# Patient Record
Sex: MALE | Race: WHITE | NOT HISPANIC OR LATINO | Employment: OTHER | URBAN - NONMETROPOLITAN AREA
[De-identification: names, ages, dates, MRNs, and addresses within clinical notes are randomized per-mention and may not be internally consistent; named-entity substitution may affect disease eponyms.]

---

## 2017-11-30 ENCOUNTER — OFFICE VISIT (OUTPATIENT)
Dept: UROLOGY | Facility: CLINIC | Age: 67
End: 2017-11-30

## 2017-11-30 DIAGNOSIS — N40.0 BENIGN PROSTATIC HYPERPLASIA WITHOUT LOWER URINARY TRACT SYMPTOMS: Primary | ICD-10-CM

## 2017-11-30 LAB — PSA SERPL-MCNC: 2.37 NG/ML (ref 0–4)

## 2017-11-30 PROCEDURE — 84153 ASSAY OF PSA TOTAL: CPT | Performed by: UROLOGY

## 2017-11-30 PROCEDURE — 36415 COLL VENOUS BLD VENIPUNCTURE: CPT | Performed by: UROLOGY

## 2017-11-30 PROCEDURE — 99204 OFFICE O/P NEW MOD 45 MIN: CPT | Performed by: UROLOGY

## 2017-11-30 RX ORDER — ALLOPURINOL 300 MG/1
300 TABLET ORAL DAILY
COMMUNITY
Start: 2017-10-23

## 2017-11-30 RX ORDER — FINASTERIDE 5 MG/1
5 TABLET, FILM COATED ORAL DAILY
Qty: 30 TABLET | Refills: 11 | Status: SHIPPED | OUTPATIENT
Start: 2017-11-30 | End: 2017-12-30

## 2017-11-30 RX ORDER — FINASTERIDE 5 MG/1
TABLET, FILM COATED ORAL
COMMUNITY
Start: 2017-10-31 | End: 2018-12-20 | Stop reason: SDUPTHER

## 2017-12-01 PROBLEM — N40.0 BENIGN PROSTATIC HYPERPLASIA WITHOUT LOWER URINARY TRACT SYMPTOMS: Status: ACTIVE | Noted: 2017-12-01

## 2018-12-20 ENCOUNTER — OFFICE VISIT (OUTPATIENT)
Dept: UROLOGY | Facility: CLINIC | Age: 68
End: 2018-12-20

## 2018-12-20 VITALS — HEIGHT: 69 IN | WEIGHT: 170 LBS | BODY MASS INDEX: 25.18 KG/M2

## 2018-12-20 DIAGNOSIS — N40.0 BENIGN PROSTATIC HYPERPLASIA, UNSPECIFIED WHETHER LOWER URINARY TRACT SYMPTOMS PRESENT: Primary | ICD-10-CM

## 2018-12-20 DIAGNOSIS — N40.0 BENIGN PROSTATIC HYPERPLASIA WITHOUT LOWER URINARY TRACT SYMPTOMS: ICD-10-CM

## 2018-12-20 LAB — PSA SERPL-MCNC: 2.98 NG/ML (ref 0–4)

## 2018-12-20 PROCEDURE — 99214 OFFICE O/P EST MOD 30 MIN: CPT | Performed by: UROLOGY

## 2018-12-20 PROCEDURE — 36415 COLL VENOUS BLD VENIPUNCTURE: CPT | Performed by: UROLOGY

## 2018-12-20 PROCEDURE — 84153 ASSAY OF PSA TOTAL: CPT | Performed by: UROLOGY

## 2018-12-20 RX ORDER — FINASTERIDE 5 MG/1
5 TABLET, FILM COATED ORAL DAILY
Qty: 30 TABLET | Refills: 11 | Status: SHIPPED | OUTPATIENT
Start: 2018-12-20 | End: 2019-11-29 | Stop reason: SDUPTHER

## 2018-12-20 NOTE — PROGRESS NOTES
Chief Complaint:          Chief Complaint   Patient presents with   • Benign Prostatic Hypertrophy       HPI:   68 y.o. male.  68-year-old white male extremely well known to me status post extensive evaluation he's had a biopsy at that time he had a volume of 36 g his PSA D was 6.4 his PSA most recently was 4.1 in December 16 and 2.5 before that but this was after course of finasteride.  Currently he is doing great there is no fevers, chills, nausea, vomiting, frequency, urgency, intermittency, dribbling or any other significant urologic problem he's here for yearly prostate exam.  He returns today's doing great he's on finasteride.  He gets up less than one time at night.  He has no discharge, no blood in, no new medications, no new surgeries, no significant lower urinary tract symptomatology such as frequency, urgency, dribbling.  He is now coaching high school basketball as        Past Medical History:      History reviewed. No pertinent past medical history.      Current Meds:     Current Outpatient Medications   Medication Sig Dispense Refill   • allopurinol (ZYLOPRIM) 300 MG tablet      • finasteride (PROSCAR) 5 MG tablet        No current facility-administered medications for this visit.         Allergies:      No Known Allergies     Past Surgical History:     History reviewed. No pertinent surgical history.      Social History:     Social History     Socioeconomic History   • Marital status: Unknown     Spouse name: Not on file   • Number of children: Not on file   • Years of education: Not on file   • Highest education level: Not on file   Social Needs   • Financial resource strain: Not on file   • Food insecurity - worry: Not on file   • Food insecurity - inability: Not on file   • Transportation needs - medical: Not on file   • Transportation needs - non-medical: Not on file   Occupational History   • Not on file   Tobacco Use   • Smoking status: Never Smoker   • Smokeless tobacco: Never  Used   Substance and Sexual Activity   • Alcohol use: Yes     Comment: Occasional   • Drug use: No   • Sexual activity: Not on file   Other Topics Concern   • Not on file   Social History Narrative   • Not on file       Family History:     Family History   Problem Relation Age of Onset   • No Known Problems Father    • Heart disease Mother        Review of Systems:     Review of Systems   Constitutional: Negative.  Negative for chills, fatigue and fever.   HENT: Negative.    Eyes: Negative.    Respiratory: Negative.  Negative for cough, shortness of breath and wheezing.    Cardiovascular: Negative.  Negative for leg swelling.   Gastrointestinal: Negative.  Negative for abdominal pain, nausea and vomiting.   Endocrine: Negative.    Musculoskeletal: Negative.  Negative for back pain.   Allergic/Immunologic: Negative.    Neurological: Negative.  Negative for dizziness and headaches.   Hematological: Negative.    Psychiatric/Behavioral: Negative.  Negative for confusion.       Physical Exam:     Physical Exam   Constitutional: He is oriented to person, place, and time. He appears well-developed and well-nourished.   HENT:   Head: Normocephalic and atraumatic.   Eyes: Conjunctivae and EOM are normal. Pupils are equal, round, and reactive to light.   Neck: Normal range of motion.   Cardiovascular: Normal rate, regular rhythm, normal heart sounds and intact distal pulses.   Pulmonary/Chest: Effort normal and breath sounds normal.   Abdominal: Soft. Bowel sounds are normal.   Genitourinary:   Genitourinary Comments: Soft nontender abdomen with no organomegaly, rigidity, or tenderness.  He has normal external genitalia and uncircumcised phallus with a freely movable foreskin bilaterally descended testes without masses there is no inguinal hernias adenopathy or abnormalities he had good rectal tone and a large smooth firm prostate.  There is no nodularity or any suspicious rectal abnormalities     Musculoskeletal: Normal  range of motion.   Neurological: He is alert and oriented to person, place, and time. He has normal reflexes.   Skin: Skin is warm and dry.   Psychiatric: He has a normal mood and affect. His behavior is normal. Judgment and thought content normal.   Nursing note and vitals reviewed.      I have reviewed the following portions of the patient's history: allergies, current medications, past family history, past medical history, past social history, past surgical history, problem list and ROS and confirm it's accurate.      Procedure:       Assessment/Plan:   BPH: Discussed the pathophysiology of BPH and obstruction.  We discussed the static and dynamic effect effects of BPH as well as using 5 alpha reductase inhibitors versus alpha blockade.  We discussed the indications for transurethral surgery as well.  And/ or other therapeutic options available including all of the newer techniques.  Refill his finasteride     Patient's Body mass index is 25.1 kg/m². BMI is above normal parameters. Recommendations include: educational material.          This document has been electronically signed by JOSE BARTH MD December 20, 2018 2:03 PM

## 2018-12-24 ENCOUNTER — TELEPHONE (OUTPATIENT)
Dept: UROLOGY | Facility: CLINIC | Age: 68
End: 2018-12-24

## 2019-11-29 DIAGNOSIS — N40.0 BENIGN PROSTATIC HYPERPLASIA, UNSPECIFIED WHETHER LOWER URINARY TRACT SYMPTOMS PRESENT: ICD-10-CM

## 2019-12-02 RX ORDER — FINASTERIDE 5 MG/1
TABLET, FILM COATED ORAL
Qty: 30 TABLET | Refills: 11 | Status: SHIPPED | OUTPATIENT
Start: 2019-12-02 | End: 2021-06-03

## 2019-12-19 ENCOUNTER — OFFICE VISIT (OUTPATIENT)
Dept: UROLOGY | Facility: CLINIC | Age: 69
End: 2019-12-19

## 2019-12-19 VITALS — HEIGHT: 69 IN | WEIGHT: 170 LBS | BODY MASS INDEX: 25.18 KG/M2

## 2019-12-19 DIAGNOSIS — N40.0 BENIGN PROSTATIC HYPERPLASIA WITHOUT LOWER URINARY TRACT SYMPTOMS: Primary | ICD-10-CM

## 2019-12-19 DIAGNOSIS — N40.0 BENIGN PROSTATIC HYPERPLASIA, UNSPECIFIED WHETHER LOWER URINARY TRACT SYMPTOMS PRESENT: ICD-10-CM

## 2019-12-19 PROCEDURE — 99213 OFFICE O/P EST LOW 20 MIN: CPT | Performed by: UROLOGY

## 2019-12-19 RX ORDER — MONTELUKAST SODIUM 10 MG/1
10 TABLET ORAL NIGHTLY
COMMUNITY

## 2019-12-19 RX ORDER — CETIRIZINE HYDROCHLORIDE 10 MG/1
10 TABLET ORAL DAILY
COMMUNITY

## 2019-12-19 RX ORDER — LISINOPRIL 10 MG/1
10 TABLET ORAL DAILY
COMMUNITY

## 2019-12-19 RX ORDER — FINASTERIDE 5 MG/1
5 TABLET, FILM COATED ORAL DAILY
Qty: 30 TABLET | Refills: 11 | Status: SHIPPED | OUTPATIENT
Start: 2019-12-19 | End: 2020-12-10 | Stop reason: SDUPTHER

## 2019-12-19 NOTE — PROGRESS NOTES
Chief Complaint:          Chief Complaint   Patient presents with   • Benign Prostatic Hypertrophy       HPI:   69 y.o. male turns today he had a recent PSA done by Dr. Иван Arreaga in [place].  He has no symptoms, no nocturia, he got a steroid injection in his knee continues on finasteride overall he is fantastic I will see him back in a year I gave him a refill      Past Medical History:      No past medical history on file.      Current Meds:     Current Outpatient Medications   Medication Sig Dispense Refill   • allopurinol (ZYLOPRIM) 300 MG tablet      • cetirizine (zyrTEC) 10 MG tablet Take 10 mg by mouth Daily.     • finasteride (PROSCAR) 5 MG tablet TAKE ONE TABLET BY MOUTH DAILY FOR 30 DOSES 30 tablet 11   • lisinopril (PRINIVIL,ZESTRIL) 10 MG tablet Take 10 mg by mouth Daily.     • montelukast (SINGULAIR) 10 MG tablet Take 10 mg by mouth Every Night.       No current facility-administered medications for this visit.         Allergies:      No Known Allergies     Past Surgical History:     No past surgical history on file.      Social History:     Social History     Socioeconomic History   • Marital status: Unknown     Spouse name: Not on file   • Number of children: Not on file   • Years of education: Not on file   • Highest education level: Not on file   Tobacco Use   • Smoking status: Never Smoker   • Smokeless tobacco: Never Used   Substance and Sexual Activity   • Alcohol use: Yes     Comment: Occasional   • Drug use: No       Family History:     Family History   Problem Relation Age of Onset   • No Known Problems Father    • Heart disease Mother        Review of Systems:     Review of Systems   Constitutional: Negative.    HENT: Negative.    Eyes: Negative.    Respiratory: Negative.    Cardiovascular: Negative.    Gastrointestinal: Negative.    Endocrine: Negative.    Musculoskeletal: Negative.    Allergic/Immunologic: Negative.    Neurological: Negative.    Hematological: Negative.     Psychiatric/Behavioral: Negative.        Physical Exam:     Physical Exam   Constitutional: He is oriented to person, place, and time. He appears well-developed and well-nourished.   HENT:   Head: Normocephalic and atraumatic.   Eyes: Pupils are equal, round, and reactive to light. Conjunctivae and EOM are normal.   Neck: Normal range of motion.   Cardiovascular: Normal rate, regular rhythm, normal heart sounds and intact distal pulses.   Pulmonary/Chest: Effort normal and breath sounds normal.   Abdominal: Soft. Bowel sounds are normal.   Musculoskeletal: Normal range of motion.   Neurological: He is alert and oriented to person, place, and time. He has normal reflexes.   Skin: Skin is warm and dry.   Psychiatric: He has a normal mood and affect. His behavior is normal. Judgment and thought content normal.   Nursing note and vitals reviewed.      I have reviewed the following portions of the patient's history: allergies, current medications, past family history, past medical history, past social history, past surgical history, problem list and ROS and confirm it's accurate.      Procedure:       Assessment/Plan:   BPH: Discussed the pathophysiology of BPH and obstruction.  We discussed the static and dynamic effect effects of BPH as well as using 5 alpha reductase inhibitors versus alpha blockade.  We discussed the indications for transurethral surgery as well.  And/ or other therapeutic options available including all of the newer techniques.            Patient's Body mass index is 25.1 kg/m². BMI is above normal parameters. Recommendations include: educational material.              This document has been electronically signed by JOSE BARTH MD December 19, 2019 1:49 PM

## 2020-12-10 ENCOUNTER — OFFICE VISIT (OUTPATIENT)
Dept: UROLOGY | Facility: CLINIC | Age: 70
End: 2020-12-10

## 2020-12-10 VITALS — TEMPERATURE: 98.7 F | HEIGHT: 69 IN | BODY MASS INDEX: 25.18 KG/M2 | WEIGHT: 170 LBS

## 2020-12-10 DIAGNOSIS — N40.0 BENIGN PROSTATIC HYPERPLASIA, UNSPECIFIED WHETHER LOWER URINARY TRACT SYMPTOMS PRESENT: Primary | ICD-10-CM

## 2020-12-10 DIAGNOSIS — N40.0 BENIGN PROSTATIC HYPERPLASIA WITHOUT LOWER URINARY TRACT SYMPTOMS: ICD-10-CM

## 2020-12-10 PROCEDURE — 84153 ASSAY OF PSA TOTAL: CPT | Performed by: UROLOGY

## 2020-12-10 PROCEDURE — 36415 COLL VENOUS BLD VENIPUNCTURE: CPT | Performed by: UROLOGY

## 2020-12-10 PROCEDURE — 99213 OFFICE O/P EST LOW 20 MIN: CPT | Performed by: UROLOGY

## 2020-12-10 RX ORDER — FINASTERIDE 5 MG/1
5 TABLET, FILM COATED ORAL DAILY
Qty: 90 TABLET | Refills: 3 | Status: SHIPPED | OUTPATIENT
Start: 2020-12-10 | End: 2021-12-13

## 2020-12-10 NOTE — PROGRESS NOTES
Chief Complaint:          Chief Complaint   Patient presents with   • Benign Prostatic Hypertrophy       HPI:   70 y.o. male returns today.  He is doing great he has no Covid.  No burning, no blood I refilled his finasteride his exam is unremarkable I will see him back in a year his PSA is currently pending      Past Medical History:      History reviewed. No pertinent past medical history.      Current Meds:     Current Outpatient Medications   Medication Sig Dispense Refill   • allopurinol (ZYLOPRIM) 300 MG tablet      • cetirizine (zyrTEC) 10 MG tablet Take 10 mg by mouth Daily.     • finasteride (PROSCAR) 5 MG tablet TAKE ONE TABLET BY MOUTH DAILY FOR 30 DOSES 30 tablet 11   • finasteride (PROSCAR) 5 MG tablet Take 1 tablet by mouth Daily. 30 tablet 11   • lisinopril (PRINIVIL,ZESTRIL) 10 MG tablet Take 10 mg by mouth Daily.     • montelukast (SINGULAIR) 10 MG tablet Take 10 mg by mouth Every Night.       No current facility-administered medications for this visit.         Allergies:      No Known Allergies     Past Surgical History:     No past surgical history on file.      Social History:     Social History     Socioeconomic History   • Marital status: Unknown     Spouse name: Not on file   • Number of children: Not on file   • Years of education: Not on file   • Highest education level: Not on file   Tobacco Use   • Smoking status: Never Smoker   • Smokeless tobacco: Never Used   Substance and Sexual Activity   • Alcohol use: Yes     Comment: Occasional   • Drug use: No       Family History:     Family History   Problem Relation Age of Onset   • No Known Problems Father    • Heart disease Mother        Review of Systems:     Review of Systems   Constitutional: Negative.    HENT: Negative.    Eyes: Negative.    Respiratory: Negative.    Cardiovascular: Negative.    Gastrointestinal: Negative.    Endocrine: Negative.    Musculoskeletal: Negative.    Allergic/Immunologic: Negative.    Neurological: Negative.     Hematological: Negative.    Psychiatric/Behavioral: Negative.        Physical Exam:     Physical Exam  Vitals signs and nursing note reviewed.   Constitutional:       Appearance: He is well-developed.   HENT:      Head: Normocephalic and atraumatic.   Eyes:      Conjunctiva/sclera: Conjunctivae normal.      Pupils: Pupils are equal, round, and reactive to light.   Neck:      Musculoskeletal: Normal range of motion.   Cardiovascular:      Rate and Rhythm: Normal rate and regular rhythm.      Heart sounds: Normal heart sounds.   Pulmonary:      Effort: Pulmonary effort is normal.      Breath sounds: Normal breath sounds.   Abdominal:      General: Bowel sounds are normal.      Palpations: Abdomen is soft.   Genitourinary:     Penis: Normal.       Scrotum/Testes: Normal.      Prostate: Normal.      Rectum: Normal.   Musculoskeletal: Normal range of motion.   Skin:     General: Skin is warm and dry.   Neurological:      Mental Status: He is alert and oriented to person, place, and time.      Deep Tendon Reflexes: Reflexes are normal and symmetric.   Psychiatric:         Behavior: Behavior normal.         Thought Content: Thought content normal.         Judgment: Judgment normal.         I have reviewed the following portions of the patient's history: allergies, current medications, past family history, past medical history, past social history, past surgical history, problem list and ROS and confirm it's accurate.      Procedure:       Assessment/Plan:   BPH: Discussed the pathophysiology of BPH and obstruction.  We discussed the static and dynamic effect effects of BPH as well as using 5 alpha reductase inhibitors versus alpha blockade.  We discussed the indications for transurethral surgery as well.  And/ or other therapeutic options available including all of the newer techniques.  Continue on finasteride  PSA testing-I am recommending a PSA blood test that stands for prostate specific antigen.  I discussed the  pathophysiology of PSA testing indicating its use in the diagnosis and management of prostate cancer.  I discussed the normal range being 0-4 but more appropriately being much closer to 0-2 in a normal male.  I discussed the fact that after certain age we don't recommend PSA testing especially in view of numerous comorbidities.  That this will not be a useful test.  I discussed many of the things that can artificially raised PSA including a recent infection, urinary tract infection, recent sexual intercourse.  Where even the type of movement such as manipulation of the prostate  riding a bicycle.  After all this is taken into account when the test is reviewed  the most important use of PSA which is the velocity measurement in other words the change of PSA with time as a very important factor in the use and that we look for greater than 20% rise over a year to help us make the prediction of prostate cancer.  I also discussed that the use with prostate cancer indicating that after a radical prostatectomy the PSA should be 0 and any rise indicates an early biochemical recurrence            Patient's Body mass index is 25.09 kg/m². BMI is above normal parameters. Recommendations include: educational material.              This document has been electronically signed by JOSE BARTH MD December 10, 2020 10:05 EST

## 2020-12-11 LAB — PSA SERPL-MCNC: 4.41 NG/ML (ref 0–4)

## 2021-02-19 ENCOUNTER — TELEPHONE (OUTPATIENT)
Dept: UROLOGY | Facility: CLINIC | Age: 71
End: 2021-02-19

## 2021-02-19 NOTE — TELEPHONE ENCOUNTER
I called the pt and let him know he was having a slight rise in PSA and needed updated labs in October.

## 2021-06-03 ENCOUNTER — OFFICE VISIT (OUTPATIENT)
Dept: UROLOGY | Facility: CLINIC | Age: 71
End: 2021-06-03

## 2021-06-03 VITALS — WEIGHT: 171 LBS | BODY MASS INDEX: 25.33 KG/M2 | HEIGHT: 69 IN

## 2021-06-03 DIAGNOSIS — N40.0 BENIGN PROSTATIC HYPERPLASIA WITHOUT LOWER URINARY TRACT SYMPTOMS: ICD-10-CM

## 2021-06-03 DIAGNOSIS — N40.0 BENIGN PROSTATIC HYPERPLASIA, UNSPECIFIED WHETHER LOWER URINARY TRACT SYMPTOMS PRESENT: Primary | ICD-10-CM

## 2021-06-03 LAB
BILIRUB BLD-MCNC: NEGATIVE MG/DL
CLARITY, POC: CLEAR
COLOR UR: YELLOW
GLUCOSE UR STRIP-MCNC: NEGATIVE MG/DL
KETONES UR QL: NEGATIVE
LEUKOCYTE EST, POC: NEGATIVE
NITRITE UR-MCNC: NEGATIVE MG/ML
PH UR: 6 [PH] (ref 5–8)
PROT UR STRIP-MCNC: NEGATIVE MG/DL
RBC # UR STRIP: NEGATIVE /UL
SP GR UR: 1.01 (ref 1–1.03)
UROBILINOGEN UR QL: NORMAL

## 2021-06-03 PROCEDURE — 36415 COLL VENOUS BLD VENIPUNCTURE: CPT | Performed by: UROLOGY

## 2021-06-03 PROCEDURE — 81003 URINALYSIS AUTO W/O SCOPE: CPT | Performed by: UROLOGY

## 2021-06-03 PROCEDURE — 99213 OFFICE O/P EST LOW 20 MIN: CPT | Performed by: UROLOGY

## 2021-06-03 PROCEDURE — 84154 ASSAY OF PSA FREE: CPT | Performed by: UROLOGY

## 2021-06-03 PROCEDURE — 84153 ASSAY OF PSA TOTAL: CPT | Performed by: UROLOGY

## 2021-06-03 RX ORDER — TADALAFIL 5 MG/1
5 TABLET ORAL DAILY PRN
Qty: 30 TABLET | Refills: 6 | Status: SHIPPED | OUTPATIENT
Start: 2021-06-03 | End: 2022-04-22 | Stop reason: SDUPTHER

## 2021-06-03 NOTE — PROGRESS NOTES
Chief Complaint:          Chief Complaint   Patient presents with   • Elevated PSA   • Blood in Urine       HPI:   71 y.o. male returns today very well-known to me with a history of microscopic hematuria his PSA performed on 12/20/2020 was 4.410.  He is on 5 alpha reductase therapy.  He had a right partial total knee replacement by Dr. Ferrer.  He is doing well his exam is unremarkable he has a PSA pending I will notify him of the results once available from my standpoint I gave him reassurance.      Past Medical History:      History reviewed. No pertinent past medical history.      Current Meds:     Current Outpatient Medications   Medication Sig Dispense Refill   • allopurinol (ZYLOPRIM) 300 MG tablet Take 300 mg by mouth Daily.     • cetirizine (zyrTEC) 10 MG tablet Take 10 mg by mouth Daily.     • finasteride (PROSCAR) 5 MG tablet Take 1 tablet by mouth Daily. 90 tablet 3   • lisinopril (PRINIVIL,ZESTRIL) 10 MG tablet Take 10 mg by mouth Daily.     • montelukast (SINGULAIR) 10 MG tablet Take 10 mg by mouth Every Night.       No current facility-administered medications for this visit.        Allergies:      No Known Allergies     Past Surgical History:     History reviewed. No pertinent surgical history.      Social History:     Social History     Socioeconomic History   • Marital status: Unknown     Spouse name: Not on file   • Number of children: Not on file   • Years of education: Not on file   • Highest education level: Not on file   Tobacco Use   • Smoking status: Never Smoker   • Smokeless tobacco: Never Used   Substance and Sexual Activity   • Alcohol use: Yes     Comment: Occasional   • Drug use: No       Family History:     Family History   Problem Relation Age of Onset   • No Known Problems Father    • Heart disease Mother        Review of Systems:     Review of Systems   Constitutional: Negative.    HENT: Negative.    Eyes: Negative.    Respiratory: Negative.    Cardiovascular: Negative.     Gastrointestinal: Negative.    Endocrine: Negative.    Musculoskeletal: Negative.    Allergic/Immunologic: Negative.    Neurological: Negative.    Hematological: Negative.    Psychiatric/Behavioral: Negative.        Physical Exam:     Physical Exam  Vitals and nursing note reviewed.   Constitutional:       Appearance: He is well-developed.   HENT:      Head: Normocephalic and atraumatic.   Eyes:      Conjunctiva/sclera: Conjunctivae normal.      Pupils: Pupils are equal, round, and reactive to light.   Cardiovascular:      Rate and Rhythm: Normal rate and regular rhythm.      Heart sounds: Normal heart sounds.   Pulmonary:      Effort: Pulmonary effort is normal.      Breath sounds: Normal breath sounds.   Abdominal:      General: Bowel sounds are normal.      Palpations: Abdomen is soft.   Genitourinary:     Penis: Normal.       Testes: Normal.      Prostate: Normal.      Rectum: Normal.   Musculoskeletal:         General: Normal range of motion.      Cervical back: Normal range of motion.   Skin:     General: Skin is warm and dry.   Neurological:      Mental Status: He is alert and oriented to person, place, and time.      Deep Tendon Reflexes: Reflexes are normal and symmetric.   Psychiatric:         Behavior: Behavior normal.         Thought Content: Thought content normal.         Judgment: Judgment normal.         I have reviewed the following portions of the patient's history: allergies, current medications, past family history, past medical history, past social history, past surgical history, problem list and ROS and confirm it's accurate.      Procedure:       Assessment/Plan:   BPH: Discussed the pathophysiology of BPH and obstruction.  We discussed the static and dynamic effect effects of BPH as well as using 5 alpha reductase inhibitors versus alpha blockade.  We discussed the indications for transurethral surgery as well.  And/ or other therapeutic options available including all of the newer  techniques.  Continue on 5 alpha reductase therapy  PSA testing-I am recommending a PSA blood test that stands for prostate specific antigen.  I discussed the pathophysiology of PSA testing indicating its use in the diagnosis and management of prostate cancer.  I discussed the normal range being 0-4 but more appropriately being much closer to 0-2 in a normal male.  I discussed the fact that after certain age we don't recommend PSA testing especially in view of numerous comorbidities.  That this will not be a useful test.  I discussed many of the things that can artificially raised PSA including a recent infection, urinary tract infection, recent sexual intercourse.  Where even the type of movement such as manipulation of the prostate  riding a bicycle.  After all this is taken into account when the test is reviewed  the most important use of PSA which is the velocity measurement in other words the change of PSA with time as a very important factor in the use and that we look for greater than 20% rise over a year to help us make the prediction of prostate cancer.  I also discussed that the use with prostate cancer indicating that after a radical prostatectomy the PSA should be 0 and any rise indicates an early biochemical recurrence history of elevated PSA keep observation                  This document has been electronically signed by JOSE BARTH MD Rina 3, 2021 15:11 EDT

## 2021-06-05 LAB
PSA FREE MFR SERPL: 11.9 %
PSA FREE SERPL-MCNC: 0.56 NG/ML
PSA SERPL-MCNC: 4.7 NG/ML (ref 0–4)

## 2021-06-11 ENCOUNTER — PRIOR AUTHORIZATION (OUTPATIENT)
Dept: UROLOGY | Facility: CLINIC | Age: 71
End: 2021-06-11

## 2021-06-17 ENCOUNTER — TELEPHONE (OUTPATIENT)
Dept: UROLOGY | Facility: CLINIC | Age: 71
End: 2021-06-17

## 2021-06-17 NOTE — TELEPHONE ENCOUNTER
Patient's wife called to let us know that the cialis is being denied by insurance, wants to know what he should do now.

## 2021-06-18 NOTE — TELEPHONE ENCOUNTER
Tried to call pt back - we can send the med to [facility] locally that is much better with the price then any other pharmacy.

## 2021-06-30 ENCOUNTER — TELEPHONE (OUTPATIENT)
Dept: UROLOGY | Facility: CLINIC | Age: 71
End: 2021-06-30

## 2021-06-30 NOTE — TELEPHONE ENCOUNTER
I called the pt to let him know his PSA level was stable and to keep his Yearly appt.    Also the pt asked if his Tadalafil was authorized I told him no that we could not do it due to it being elective and offered to send it out to [facility] and he said to send it.

## 2021-08-16 ENCOUNTER — TRANSCRIBE ORDERS (OUTPATIENT)
Dept: ADMINISTRATIVE | Facility: HOSPITAL | Age: 71
End: 2021-08-16

## 2021-08-16 DIAGNOSIS — Z01.818 OTHER SPECIFIED PRE-OPERATIVE EXAMINATION: Primary | ICD-10-CM

## 2021-12-09 ENCOUNTER — OFFICE VISIT (OUTPATIENT)
Dept: UROLOGY | Facility: CLINIC | Age: 71
End: 2021-12-09

## 2021-12-09 VITALS — BODY MASS INDEX: 25.34 KG/M2 | HEIGHT: 69 IN | WEIGHT: 171.08 LBS

## 2021-12-09 DIAGNOSIS — N40.0 BENIGN PROSTATIC HYPERPLASIA, UNSPECIFIED WHETHER LOWER URINARY TRACT SYMPTOMS PRESENT: Primary | ICD-10-CM

## 2021-12-09 DIAGNOSIS — N40.0 BENIGN PROSTATIC HYPERPLASIA WITHOUT LOWER URINARY TRACT SYMPTOMS: ICD-10-CM

## 2021-12-09 PROCEDURE — 84154 ASSAY OF PSA FREE: CPT | Performed by: UROLOGY

## 2021-12-09 PROCEDURE — 36415 COLL VENOUS BLD VENIPUNCTURE: CPT | Performed by: UROLOGY

## 2021-12-09 PROCEDURE — 84153 ASSAY OF PSA TOTAL: CPT | Performed by: UROLOGY

## 2021-12-09 PROCEDURE — 99213 OFFICE O/P EST LOW 20 MIN: CPT | Performed by: UROLOGY

## 2021-12-09 NOTE — PROGRESS NOTES
Chief Complaint:          Chief Complaint   Patient presents with   • Benign Prostatic Hypertrophy       HPI:   71 y.o. male returns today was seen previously for BPH.  He has been on Proscar he has no symptoms no burning he reports no lower urinary tract symptomatology, particularly irritative symptoms such as frequency, urgency, dysuria, and obstructive symptomatology, particularly dribbling, hesitancy, and intermittency.      Past Medical History:      History reviewed. No pertinent past medical history.      Current Meds:     Current Outpatient Medications   Medication Sig Dispense Refill   • allopurinol (ZYLOPRIM) 300 MG tablet Take 300 mg by mouth Daily.     • cetirizine (zyrTEC) 10 MG tablet Take 10 mg by mouth Daily.     • finasteride (PROSCAR) 5 MG tablet Take 1 tablet by mouth Daily. 90 tablet 3   • lisinopril (PRINIVIL,ZESTRIL) 10 MG tablet Take 10 mg by mouth Daily.     • montelukast (SINGULAIR) 10 MG tablet Take 10 mg by mouth Every Night.     • tadalafil (Cialis) 5 MG tablet Take 1 tablet by mouth Daily As Needed for Erectile Dysfunction. Take one Daily 30 tablet 6     No current facility-administered medications for this visit.        Allergies:      No Known Allergies     Past Surgical History:     Past Surgical History:   Procedure Laterality Date   • PARTIAL KNEE ARTHROPLASTY Right          Social History:     Social History     Socioeconomic History   • Marital status:    Tobacco Use   • Smoking status: Never Smoker   • Smokeless tobacco: Never Used   Substance and Sexual Activity   • Alcohol use: Yes     Comment: Occasional   • Drug use: No       Family History:     Family History   Problem Relation Age of Onset   • No Known Problems Father    • Heart disease Mother        Review of Systems:     Review of Systems   Constitutional: Negative.    HENT: Negative.    Eyes: Negative.    Respiratory: Negative.    Cardiovascular: Negative.    Gastrointestinal: Negative.    Endocrine: Negative.     Musculoskeletal: Negative.    Allergic/Immunologic: Negative.    Neurological: Negative.    Hematological: Negative.    Psychiatric/Behavioral: Negative.        Physical Exam:     Physical Exam  Vitals and nursing note reviewed.   Constitutional:       Appearance: He is well-developed.   HENT:      Head: Normocephalic and atraumatic.   Eyes:      Conjunctiva/sclera: Conjunctivae normal.      Pupils: Pupils are equal, round, and reactive to light.   Cardiovascular:      Rate and Rhythm: Normal rate and regular rhythm.      Heart sounds: Normal heart sounds.   Pulmonary:      Effort: Pulmonary effort is normal.      Breath sounds: Normal breath sounds.   Abdominal:      General: Bowel sounds are normal.      Palpations: Abdomen is soft.   Musculoskeletal:         General: Normal range of motion.      Cervical back: Normal range of motion.   Skin:     General: Skin is warm and dry.   Neurological:      Mental Status: He is alert and oriented to person, place, and time.      Deep Tendon Reflexes: Reflexes are normal and symmetric.   Psychiatric:         Behavior: Behavior normal.         Thought Content: Thought content normal.         Judgment: Judgment normal.         I have reviewed the following portions of the patient's history: Allergies, current medications, past family history, past medical history, past social history, past surgical history, problem list, and ROS and confirm it is accurate.      Procedure:       Assessment/Plan:   Elevated prostate specific antigen-we discussed the diagnosis of elevated prostate-specific antigen.  I explained the pathophysiology of PSA.  It is a serine protease that's function in the male reproductive tract is to facilitate the liquefaction of semen.  It is for this reason the body does not want it freely floating in the serum and why typically bound tightly to albumin.  We discussed why we used both a PSA free and total to determine the need for more aggressive therapy. I  discussed the normal range.  Additionally, it was in the range of 1 to 4, but more recently has been downgraded to something less than 2 or even approaching 1.  I discussed the risk of family history, particularly the fact that the average male has a 14% risk of prostate cancer and that in the face of a positive diagnosis in a father it will tablet and any other first-generation relative continued tablet insofar that a father and brother with prostate cancer will produce almost a 50% risk of prostate cancer.  I discussed the use of the temporal use of PSA as the best option for monitoring.  We also discussed the fact that an elevated PSA is an isolated event does not mean that this is prostate cancer and should not engender worry in this regard. I discussed other things that can elevate PSA including constipation, prostatitis, infection, recent intercourse etc., as well as the risks and benefits associated with this.  Also discussed the fact that this is with a dilutional test and as a consequence of such were present produce slight variations on a single specimen.  Further discussed the risks and benefits of a prostate biopsy as well.  BPH: Discussed the pathophysiology of BPH and obstruction.  We discussed the static and dynamic effects of BPH as well as using 5 alpha reductase inhibitors versus alpha blockade.  We discussed the indications for transurethral surgery as well and/ or other therapeutic options available including all of the newer techniques.  Continue finasteride            This document has been electronically signed by JOSE BARTH MD December 9, 2021 13:28 EST

## 2021-12-11 DIAGNOSIS — N40.0 BENIGN PROSTATIC HYPERPLASIA, UNSPECIFIED WHETHER LOWER URINARY TRACT SYMPTOMS PRESENT: ICD-10-CM

## 2021-12-11 LAB
PSA FREE MFR SERPL: 10.8 %
PSA FREE SERPL-MCNC: 0.68 NG/ML
PSA SERPL-MCNC: 6.3 NG/ML (ref 0–4)

## 2021-12-13 RX ORDER — FINASTERIDE 5 MG/1
5 TABLET, FILM COATED ORAL DAILY
Qty: 90 TABLET | Refills: 3 | Status: SHIPPED | OUTPATIENT
Start: 2021-12-13 | End: 2022-04-24 | Stop reason: SDUPTHER

## 2021-12-14 ENCOUNTER — TELEPHONE (OUTPATIENT)
Dept: UROLOGY | Facility: CLINIC | Age: 71
End: 2021-12-14

## 2021-12-14 NOTE — TELEPHONE ENCOUNTER
----- Message from Nelida Wilkins MA sent at 12/13/2021 12:49 PM EST -----  Please call patient and schedule a prostate bx due to his PSA rising.   Thanks   ----- Message -----  From: Jayson Mccormick MD  Sent: 12/13/2021  11:57 AM EST  To: Nelida Wilkins MA    Another jump in PSA needs a US with Biopsy  ----- Message -----  From: Lab, Background User  Sent: 12/11/2021   7:10 AM EST  To: Jayson Mccormick MD

## 2022-01-27 ENCOUNTER — TELEPHONE (OUTPATIENT)
Dept: GASTROENTEROLOGY | Facility: CLINIC | Age: 72
End: 2022-01-27

## 2022-01-27 DIAGNOSIS — N40.0 BENIGN PROSTATIC HYPERPLASIA WITHOUT LOWER URINARY TRACT SYMPTOMS: Primary | ICD-10-CM

## 2022-01-27 RX ORDER — SODIUM PHOSPHATE,MONO-DIBASIC 19G-7G/118
ENEMA (ML) RECTAL
Qty: 1 ENEMA | Refills: 0 | Status: SHIPPED | OUTPATIENT
Start: 2022-01-27 | End: 2022-02-04

## 2022-01-27 RX ORDER — CIPROFLOXACIN 500 MG/1
TABLET, FILM COATED ORAL
Qty: 4 TABLET | Refills: 0 | Status: SHIPPED | OUTPATIENT
Start: 2022-01-27 | End: 2022-02-04

## 2022-01-27 RX ORDER — CLINDAMYCIN HYDROCHLORIDE 300 MG/1
300 CAPSULE ORAL 2 TIMES DAILY
Qty: 6 CAPSULE | Refills: 0 | Status: SHIPPED | OUTPATIENT
Start: 2022-01-27 | End: 2022-01-30

## 2022-01-27 RX ORDER — DIAZEPAM 10 MG/1
TABLET ORAL
Qty: 2 TABLET | Refills: 0 | Status: SHIPPED | OUTPATIENT
Start: 2022-01-27 | End: 2022-02-04

## 2022-01-27 NOTE — TELEPHONE ENCOUNTER
Patient called stating he did not receive any medications for his biopsy tomorrow he needs those sent to [facility]

## 2022-01-28 ENCOUNTER — PROCEDURE VISIT (OUTPATIENT)
Dept: UROLOGY | Facility: CLINIC | Age: 72
End: 2022-01-28

## 2022-01-28 VITALS — BODY MASS INDEX: 25.33 KG/M2 | HEIGHT: 69 IN | WEIGHT: 171 LBS

## 2022-01-28 DIAGNOSIS — N40.0 BENIGN PROSTATIC HYPERPLASIA WITHOUT LOWER URINARY TRACT SYMPTOMS: Primary | ICD-10-CM

## 2022-01-28 DIAGNOSIS — Z48.816 AFTERCARE FOLLOWING SURGERY OF THE GENITOURINARY SYSTEM: ICD-10-CM

## 2022-01-28 PROCEDURE — 55700 PR PROSTATE NEEDLE BIOPSY ANY APPROACH: CPT | Performed by: UROLOGY

## 2022-01-28 PROCEDURE — 96372 THER/PROPH/DIAG INJ SC/IM: CPT | Performed by: UROLOGY

## 2022-01-28 PROCEDURE — 76942 ECHO GUIDE FOR BIOPSY: CPT | Performed by: UROLOGY

## 2022-01-28 RX ORDER — GENTAMICIN SULFATE 40 MG/ML
80 INJECTION, SOLUTION INTRAMUSCULAR; INTRAVENOUS ONCE
Status: COMPLETED | OUTPATIENT
Start: 2022-01-28 | End: 2022-01-28

## 2022-01-28 RX ADMIN — GENTAMICIN SULFATE 80 MG: 40 INJECTION, SOLUTION INTRAMUSCULAR; INTRAVENOUS at 13:44

## 2022-02-04 ENCOUNTER — OFFICE VISIT (OUTPATIENT)
Dept: UROLOGY | Facility: CLINIC | Age: 72
End: 2022-02-04

## 2022-02-04 VITALS — BODY MASS INDEX: 26.36 KG/M2 | HEIGHT: 69 IN | WEIGHT: 178 LBS

## 2022-02-04 DIAGNOSIS — C61 PROSTATE CANCER: Primary | ICD-10-CM

## 2022-02-04 PROCEDURE — 99213 OFFICE O/P EST LOW 20 MIN: CPT | Performed by: UROLOGY

## 2022-02-04 RX ORDER — PANTOPRAZOLE SODIUM 40 MG/1
1 TABLET, DELAYED RELEASE ORAL 2 TIMES DAILY
COMMUNITY
Start: 2021-12-12

## 2022-02-04 NOTE — PROGRESS NOTES
Chief Complaint:          Chief Complaint   Patient presents with   • Results       HPI:   71 y.o. male.  That is post a prostate biopsy had stage T2 NX MX, I will go ahead initiate jennifer tovar and an Oncotype    Past Medical History:        Past Medical History:   Diagnosis Date   • Elevated PSA    • Erectile dysfunction    • Hypertension    • Kidney stone          Current Meds:     Current Outpatient Medications   Medication Sig Dispense Refill   • allopurinol (ZYLOPRIM) 300 MG tablet Take 300 mg by mouth Daily.     • cetirizine (zyrTEC) 10 MG tablet Take 10 mg by mouth Daily.     • finasteride (PROSCAR) 5 MG tablet TAKE 1 TABLET BY MOUTH DAILY 90 tablet 3   • lisinopril (PRINIVIL,ZESTRIL) 10 MG tablet Take 10 mg by mouth Daily.     • montelukast (SINGULAIR) 10 MG tablet Take 10 mg by mouth Every Night.     • pantoprazole (PROTONIX) 40 MG EC tablet Take 1 tablet by mouth 2 (Two) Times a Day.     • tadalafil (Cialis) 5 MG tablet Take 1 tablet by mouth Daily As Needed for Erectile Dysfunction. Take one Daily 30 tablet 6     No current facility-administered medications for this visit.        Allergies:      No Known Allergies     Past Surgical History:     Past Surgical History:   Procedure Laterality Date   • KIDNEY STONE SURGERY     • PARTIAL KNEE ARTHROPLASTY Right          Social History:     Social History     Socioeconomic History   • Marital status:    Tobacco Use   • Smoking status: Never Smoker   • Smokeless tobacco: Never Used   Substance and Sexual Activity   • Alcohol use: Yes     Alcohol/week: 0.0 standard drinks     Comment: Occasionally beer   • Drug use: No   • Sexual activity: Yes     Partners: Female     Birth control/protection: None       Family History:     Family History   Problem Relation Age of Onset   • No Known Problems Father    • Heart disease Mother        Review of Systems:     Review of Systems   Constitutional: Negative.    HENT: Negative.    Eyes: Negative.    Respiratory:  Negative.    Cardiovascular: Negative.    Gastrointestinal: Negative.    Endocrine: Negative.    Musculoskeletal: Negative.    Allergic/Immunologic: Negative.    Neurological: Negative.    Hematological: Negative.    Psychiatric/Behavioral: Negative.        Physical Exam:     Physical Exam  Vitals and nursing note reviewed.   Constitutional:       Appearance: He is well-developed.   HENT:      Head: Normocephalic and atraumatic.   Eyes:      Conjunctiva/sclera: Conjunctivae normal.      Pupils: Pupils are equal, round, and reactive to light.   Cardiovascular:      Rate and Rhythm: Normal rate and regular rhythm.      Heart sounds: Normal heart sounds.   Pulmonary:      Effort: Pulmonary effort is normal.      Breath sounds: Normal breath sounds.   Abdominal:      General: Bowel sounds are normal.      Palpations: Abdomen is soft.   Musculoskeletal:         General: Normal range of motion.      Cervical back: Normal range of motion.   Skin:     General: Skin is warm and dry.   Neurological:      Mental Status: He is alert and oriented to person, place, and time.      Deep Tendon Reflexes: Reflexes are normal and symmetric.   Psychiatric:         Behavior: Behavior normal.         Thought Content: Thought content normal.         Judgment: Judgment normal.         I have reviewed the following portions of the patient's history: Allergies, current medications, past family history, past medical history, past social history, past surgical history, problem list, and ROS and confirm it is accurate.      Procedure:       Assessment/Plan:   Prostate cancer:  He returns today status post biopsy for extensive discussion of prostate cancer.  We discussed staging and grading of the disease.  I described with a [place] system being from a 2 to10 scale with the most common low-grade pattern being a [place] 6.  I discussed the staging workup including a total body bone scan and CT scan especially with a PSA greater than 10.  We  discussed the various options at length. I discussed a radical retropubic prostatectomy done in the traditional fashion and using the robotic technique.  I then discussed radiation treatment both seed therapy and external beam therapy using Gold fiduciary markers.  We talked about some of the other alternatives such as a cryosurgical ablation at high intensity focused ultrasound as being viable alternatives but not recommended at this time.  He focused on aggressive watchful waiting and explained that currently low literature it's been discovered that a lot of men can actually observe it especially with numerous other comorbidities cannot have problems from the cancer by itself.  Talked about hormonal ablation and the side effects of creation of insulin resistance.  Overall, the patient was given appropriate literature, is going to think about it, and I'm going to revisit the topic with them after the next office visit.  I have a CT and total body bone scan pending I suspect these will be negative  Oncotype DX genomic prostate score is an RNA expression assay of 12 genes involved in androgen signaling, cellular organization, stromal response and cellular proliferation.  This test is performed on biopsy samples and the GPS is scored 0 to 100 with higher numbers indicating less favorable disease.  This is an excellent prospective validation of adverse pathology at prostatectomy or biopsy.              This document has been electronically signed by JOSE BARTH MD February 4, 2022 13:10 EST

## 2022-02-07 PROBLEM — C61 PROSTATE CANCER (HCC): Status: ACTIVE | Noted: 2022-02-07

## 2022-02-15 ENCOUNTER — HOSPITAL ENCOUNTER (OUTPATIENT)
Dept: NUCLEAR MEDICINE | Facility: HOSPITAL | Age: 72
Discharge: HOME | End: 2022-02-15

## 2022-02-15 DIAGNOSIS — C61 PROSTATE CANCER: ICD-10-CM

## 2022-02-15 PROCEDURE — 78306 BONE IMAGING WHOLE BODY: CPT

## 2022-02-15 PROCEDURE — 0 TECHNETIUM OXIDRONATE KIT: Performed by: UROLOGY

## 2022-02-15 PROCEDURE — 78306 BONE IMAGING WHOLE BODY: CPT | Performed by: RADIOLOGY

## 2022-02-15 PROCEDURE — A9561 TC99M OXIDRONATE: HCPCS | Performed by: UROLOGY

## 2022-02-15 RX ADMIN — TECHNETIUM TC 99M OXIDRONATE 1 DOSE: 3.15 INJECTION, POWDER, LYOPHILIZED, FOR SOLUTION INTRAVENOUS at 08:37

## 2022-02-18 ENCOUNTER — HOSPITAL ENCOUNTER (OUTPATIENT)
Dept: CT IMAGING | Facility: HOSPITAL | Age: 72
Discharge: HOME | End: 2022-02-18
Admitting: UROLOGY

## 2022-02-18 DIAGNOSIS — C61 PROSTATE CANCER: ICD-10-CM

## 2022-02-18 LAB — CREAT BLDA-MCNC: 1 MG/DL (ref 0.6–1.3)

## 2022-02-18 PROCEDURE — 25010000002 IOPAMIDOL 61 % SOLUTION: Performed by: UROLOGY

## 2022-02-18 PROCEDURE — 74178 CT ABD&PLV WO CNTR FLWD CNTR: CPT

## 2022-02-18 PROCEDURE — 82565 ASSAY OF CREATININE: CPT

## 2022-02-18 PROCEDURE — 74178 CT ABD&PLV WO CNTR FLWD CNTR: CPT | Performed by: RADIOLOGY

## 2022-02-18 RX ADMIN — IOPAMIDOL 85 ML: 612 INJECTION, SOLUTION INTRAVENOUS at 08:17

## 2022-02-21 ENCOUNTER — OFFICE VISIT (OUTPATIENT)
Dept: UROLOGY | Facility: CLINIC | Age: 72
End: 2022-02-21

## 2022-02-21 VITALS — WEIGHT: 178 LBS | HEIGHT: 69 IN | BODY MASS INDEX: 26.36 KG/M2

## 2022-02-21 DIAGNOSIS — C61 PROSTATE CANCER: Primary | ICD-10-CM

## 2022-02-21 PROCEDURE — 99213 OFFICE O/P EST LOW 20 MIN: CPT | Performed by: UROLOGY

## 2022-02-21 NOTE — PROGRESS NOTES
Chief Complaint:          Chief Complaint   Patient presents with   • Prostate Cancer     3 week follow up        HPI:   71 y.o. male positive biopsy for prostate cancer he does staging grading him to get an Oncotype get a CT scan and see him back based on this.      Past Medical History:        Past Medical History:   Diagnosis Date   • Benign prostatic hyperplasia without lower urinary tract symptoms    • Elevated PSA    • Erectile dysfunction    • Hypertension    • Kidney stone    • Prostate cancer (HCC) 2/7/2022         Current Meds:     Current Outpatient Medications   Medication Sig Dispense Refill   • allopurinol (ZYLOPRIM) 300 MG tablet Take 300 mg by mouth Daily.     • cetirizine (zyrTEC) 10 MG tablet Take 10 mg by mouth Daily.     • finasteride (PROSCAR) 5 MG tablet TAKE 1 TABLET BY MOUTH DAILY 90 tablet 3   • lisinopril (PRINIVIL,ZESTRIL) 10 MG tablet Take 10 mg by mouth Daily.     • montelukast (SINGULAIR) 10 MG tablet Take 10 mg by mouth Every Night.     • pantoprazole (PROTONIX) 40 MG EC tablet Take 1 tablet by mouth 2 (Two) Times a Day.     • tadalafil (Cialis) 5 MG tablet Take 1 tablet by mouth Daily As Needed for Erectile Dysfunction. Take one Daily (Patient taking differently: Take 5 mg by mouth Daily. Take one Daily) 30 tablet 6     No current facility-administered medications for this visit.        Allergies:      No Known Allergies     Past Surgical History:     Past Surgical History:   Procedure Laterality Date   • KIDNEY STONE SURGERY     • PARTIAL KNEE ARTHROPLASTY Right          Social History:     Social History     Socioeconomic History   • Marital status:    Tobacco Use   • Smoking status: Never Smoker   • Smokeless tobacco: Never Used   Vaping Use   • Vaping Use: Never used   Substance and Sexual Activity   • Alcohol use: Yes     Alcohol/week: 0.0 standard drinks     Comment: Occasionally beer   • Drug use: No   • Sexual activity: Yes     Partners: Female     Birth  control/protection: None       Family History:     Family History   Problem Relation Age of Onset   • No Known Problems Father    • Heart disease Mother        Review of Systems:     Review of Systems   Constitutional: Negative.    HENT: Negative.    Eyes: Negative.    Respiratory: Negative.    Cardiovascular: Negative.    Gastrointestinal: Negative.    Endocrine: Negative.    Musculoskeletal: Negative.    Allergic/Immunologic: Negative.    Neurological: Negative.    Hematological: Negative.    Psychiatric/Behavioral: Negative.        Physical Exam:     Physical Exam  Vitals and nursing note reviewed.   Constitutional:       Appearance: He is well-developed.   HENT:      Head: Normocephalic and atraumatic.   Eyes:      Conjunctiva/sclera: Conjunctivae normal.      Pupils: Pupils are equal, round, and reactive to light.   Cardiovascular:      Rate and Rhythm: Normal rate and regular rhythm.      Heart sounds: Normal heart sounds.   Pulmonary:      Effort: Pulmonary effort is normal.      Breath sounds: Normal breath sounds.   Abdominal:      General: Bowel sounds are normal.      Palpations: Abdomen is soft.   Musculoskeletal:         General: Normal range of motion.      Cervical back: Normal range of motion.   Skin:     General: Skin is warm and dry.   Neurological:      Mental Status: He is alert and oriented to person, place, and time.      Deep Tendon Reflexes: Reflexes are normal and symmetric.   Psychiatric:         Behavior: Behavior normal.         Thought Content: Thought content normal.         Judgment: Judgment normal.         I have reviewed the following portions of the patient's history: Allergies, current medications, past family history, past medical history, past social history, past surgical history, problem list, and ROS and confirm it is accurate.      Procedure:       Assessment/Plan:   Prostate cancer:  He returns today status post biopsy for extensive discussion of prostate cancer.  We  discussed staging and grading of the disease.  I described with a [place] system being from a 2 to10 scale with the most common low-grade pattern being a [place] 6.  I discussed the staging workup including a total body bone scan and CT scan especially with a PSA greater than 10.  We discussed the various options at length. I discussed a radical retropubic prostatectomy done in the traditional fashion and using the robotic technique.  I then discussed radiation treatment both seed therapy and external beam therapy using Gold fiduciary markers.  We talked about some of the other alternatives such as a cryosurgical ablation at high intensity focused ultrasound as being viable alternatives but not recommended at this time.  He focused on aggressive watchful waiting and explained that currently low literature it's been discovered that a lot of men can actually observe it especially with numerous other comorbidities cannot have problems from the cancer by itself.  Talked about hormonal ablation and the side effects of creation of insulin resistance.  Overall, the patient was given appropriate literature, is going to think about it, and I'm going to revisit the topic with them after the next office visit.  Had a negative staging and grading work-up I will set him up for radiation.  Oncotype DX genomic prostate score is an RNA expression assay of 12 genes involved in androgen signaling, cellular organization, stromal response and cellular proliferation.  This test is performed on biopsy samples and the GPS is scored 0 to 100 with higher numbers indicating less favorable disease.  This is an excellent prospective validation of adverse pathology at prostatectomy or biopsy.              This document has been electronically signed by JOSE BARTH MD February 21, 2022 14:07 EST

## 2022-02-25 ENCOUNTER — TELEPHONE (OUTPATIENT)
Dept: UROLOGY | Facility: CLINIC | Age: 72
End: 2022-02-25

## 2022-03-02 NOTE — TELEPHONE ENCOUNTER
Called patient to make sure they were contacted with date and time of referral. Spoke with wife, she stated they had been contacted.

## 2022-03-10 ENCOUNTER — CONSULT (OUTPATIENT)
Dept: RADIATION ONCOLOGY | Facility: HOSPITAL | Age: 72
End: 2022-03-10

## 2022-03-10 ENCOUNTER — LAB (OUTPATIENT)
Dept: LAB | Facility: HOSPITAL | Age: 72
End: 2022-03-10

## 2022-03-10 ENCOUNTER — DOCUMENTATION (OUTPATIENT)
Dept: ONCOLOGY | Facility: HOSPITAL | Age: 72
End: 2022-03-10

## 2022-03-10 ENCOUNTER — OFFICE VISIT (OUTPATIENT)
Dept: RADIATION ONCOLOGY | Facility: HOSPITAL | Age: 72
End: 2022-03-10

## 2022-03-10 VITALS
SYSTOLIC BLOOD PRESSURE: 141 MMHG | BODY MASS INDEX: 25.48 KG/M2 | OXYGEN SATURATION: 98 % | DIASTOLIC BLOOD PRESSURE: 75 MMHG | HEIGHT: 69 IN | HEART RATE: 66 BPM | WEIGHT: 172 LBS | TEMPERATURE: 97.8 F | RESPIRATION RATE: 18 BRPM

## 2022-03-10 DIAGNOSIS — C61 PROSTATE CANCER: ICD-10-CM

## 2022-03-10 LAB
FLUAV RNA RESP QL NAA+PROBE: NOT DETECTED
FLUBV RNA RESP QL NAA+PROBE: NOT DETECTED
SARS-COV-2 RNA RESP QL NAA+PROBE: NOT DETECTED

## 2022-03-10 PROCEDURE — 87636 SARSCOV2 & INF A&B AMP PRB: CPT

## 2022-03-10 PROCEDURE — 99204 OFFICE O/P NEW MOD 45 MIN: CPT | Performed by: RADIOLOGY

## 2022-03-10 PROCEDURE — 77263 THER RADIOLOGY TX PLNG CPLX: CPT | Performed by: RADIOLOGY

## 2022-03-10 PROCEDURE — G0463 HOSPITAL OUTPT CLINIC VISIT: HCPCS | Performed by: RADIOLOGY

## 2022-03-10 NOTE — PROGRESS NOTES
New Patient Office Consult      Patient Name: NICOLA CARRANZA  : 1950   MRN: [id number]     Requesting Physician: Jayson Mccormick,*    Chief Complaint:    Chief Complaint   Patient presents with   • Prostate Cancer     Staging:  T2 NX MX    History of Present Illness: NICOLA CARRANZA is a pleasant 71 y.o. male who is here today for consultation regarding radiation therapy.  He is accompanied today by his wife.     He has been seeing Dr. Mccormick for 2 years, initially for BPH.  He was checking his PSA regularly.  Recently had an increase from 4.7 to 6.3.  Dr. Mccormick performed a biopsy on 2022.  Pathology revealed Ruthann 3+3 = 6 to the right apex and left base, and a Ruthann 3+4 = 7 to the left apex in the left mid.  A bone scan was performed on 2/15/2022 which was negative.  And a CT of the abdomen pelvis was previously done on 2022 and this was negative as well.  He was then referred here for radiation therapy.    Today the patient is doing well with no complaints.  He states that his urinary function has been doing good.  He has no symptoms of dysuria, frequency, hematuria, nocturia, diarrhea, or urinary incontinence.    Subjective      Review of Systems:   Review of Systems   Constitutional: Negative.    HENT: Negative.    Eyes: Negative.    Respiratory: Negative.    Cardiovascular: Negative.    Gastrointestinal: Negative.  Negative for diarrhea.   Endocrine: Negative.    Genitourinary: Negative.  Negative for dysuria, frequency, hematuria, nocturia, urgency and urinary incontinence.   Musculoskeletal: Negative.    Skin: Negative.  Negative for color change.   Neurological: Negative.    Hematological: Negative.    Psychiatric/Behavioral: Negative.      Review of Systems   Constitutional: Negative.    HENT:  Negative.    Eyes: Negative.    Respiratory: Negative.    Cardiovascular: Negative.    Gastrointestinal: Negative.  Negative for diarrhea.   Endocrine: Negative.    Genitourinary:  Negative.  Negative for bladder incontinence, dysuria, frequency, hematuria, nocturia and urgency.    Musculoskeletal: Negative.    Skin: Negative.  Negative for color change.   Neurological: Negative.    Hematological: Negative.    Psychiatric/Behavioral: Negative.        I have reviewed and confirmed the accuracy of the ROS as documented by the MA/LPN/RN RONY Ko     Past Oncology History:   Oncology/Hematology History    No history exists.        Past Radiation History:  No previous exposures to ionizing radiation therapy and there are not relative contraindications including connective tissue disorder.     Past Medical History:   Past Medical History:   Diagnosis Date   • Benign prostatic hyperplasia without lower urinary tract symptoms    • Elevated PSA    • Erectile dysfunction    • Hypertension    • Kidney stone    • Prostate cancer (HCC) 2/7/2022       Past Surgical History:   Past Surgical History:   Procedure Laterality Date   • KIDNEY STONE SURGERY     • PARTIAL KNEE ARTHROPLASTY Right        Family History:   Family History   Problem Relation Age of Onset   • No Known Problems Father    • Heart disease Mother        Social History:   Social History     Socioeconomic History   • Marital status:    Tobacco Use   • Smoking status: Never Smoker   • Smokeless tobacco: Never Used   Vaping Use   • Vaping Use: Never used   Substance and Sexual Activity   • Alcohol use: Yes     Alcohol/week: 0.0 standard drinks     Comment: Occasionally beer   • Drug use: No   • Sexual activity: Yes     Partners: Female     Birth control/protection: None       Medications:     Current Outpatient Medications:   •  allopurinol (ZYLOPRIM) 300 MG tablet, Take 300 mg by mouth Daily., Disp: , Rfl:   •  cetirizine (zyrTEC) 10 MG tablet, Take 10 mg by mouth Daily., Disp: , Rfl:   •  finasteride (PROSCAR) 5 MG tablet, TAKE 1 TABLET BY MOUTH DAILY, Disp: 90 tablet, Rfl: 3  •  lisinopril (PRINIVIL,ZESTRIL) 10 MG  tablet, Take 10 mg by mouth Daily., Disp: , Rfl:   •  montelukast (SINGULAIR) 10 MG tablet, Take 10 mg by mouth Every Night., Disp: , Rfl:   •  pantoprazole (PROTONIX) 40 MG EC tablet, Take 1 tablet by mouth 2 (Two) Times a Day., Disp: , Rfl:   •  tadalafil (Cialis) 5 MG tablet, Take 1 tablet by mouth Daily As Needed for Erectile Dysfunction. Take one Daily (Patient taking differently: Take 5 mg by mouth Daily. Take one Daily), Disp: 30 tablet, Rfl: 6    Allergies:   No Known Allergies    KPS: 90 %     Results Review:   The following data was reviewed by: RONY Ko on 03/10/2022:  Common labs    Common Labsle 6/3/21 12/9/21 2/18/22   Creatinine   1.00   PSA 4.7 (A) 6.3 (A)    (A) Abnormal value       Comments are available for some flowsheets but are not being displayed.             Imaging:  CT Abdomen Pelvis With & Without Contrast    Result Date: 2/18/2022  1.  Mild pelvocaliectasis of the left kidney that may represent parapelvic cystic formation or sequelae of chronic UPJ stenosis. 2.  Heterogeneous mild enlargement of the prostate gland measuring up to 4.6 cm. 3.  Degenerative changes lumbar spine as described.  This report was finalized on 2/18/2022 3:02 PM by Dr. Garry Loomis MD.      NM Bone Scan Whole Body    Result Date: 2/15/2022  No evidence of bony metastatic disease.  This report was finalized on 2/15/2022 12:01 PM by Dr. Garry Loomis MD.         Pathology:  1/28/2022      Objective     Physical Exam:  Physical Exam  Constitutional:       General: He is not in acute distress.     Appearance: Normal appearance. He is normal weight. He is not ill-appearing.   HENT:      Head: Normocephalic.      Nose: Nose normal.      Mouth/Throat:      Mouth: Mucous membranes are moist.      Pharynx: Oropharynx is clear.   Eyes:      Extraocular Movements: Extraocular movements intact.      Conjunctiva/sclera: Conjunctivae normal.      Pupils: Pupils are equal, round, and reactive to light.  "  Cardiovascular:      Rate and Rhythm: Normal rate and regular rhythm.      Pulses: Normal pulses.      Heart sounds: Normal heart sounds.   Pulmonary:      Effort: Pulmonary effort is normal. No respiratory distress.      Breath sounds: Normal breath sounds.   Abdominal:      General: Abdomen is flat. Bowel sounds are normal. There is no distension.      Palpations: Abdomen is soft. There is no mass.   Musculoskeletal:         General: No swelling or tenderness. Normal range of motion.      Cervical back: Normal range of motion.   Skin:     General: Skin is warm and dry.      Capillary Refill: Capillary refill takes less than 2 seconds.   Neurological:      General: No focal deficit present.      Mental Status: He is alert and oriented to person, place, and time. Mental status is at baseline.   Psychiatric:         Mood and Affect: Mood normal.         Behavior: Behavior normal.         Thought Content: Thought content normal.         Judgment: Judgment normal.         Vital Signs:   Vitals:    03/10/22 0830   BP: 141/75   Pulse: 66   Resp: 18   Temp: 97.8 °F (36.6 °C)   TempSrc: Temporal   SpO2: 98%   Weight: 78 kg (172 lb)   Height: 175.3 cm (69\")   PainSc: 0-No pain     Body mass index is 25.4 kg/m².       Assessment / Plan      Assessment/Plan:   71 year old M with intermediate/low risk prostate cancer with PSA 6.3, G6 in right apex and left base, G7 (3+4) in left apex and left mid, (50% with perineural invasion).  I have independently reviewed the CT and Bone scan, they are all negative.     We have discussed options for this patient. His options include surgery with pelvic lymph node dissection +/- adjuvant therapies, external beam radiotherapy or brachytherapy, or active surveillance. Patient elected radiation.    Also, we have discussed in detail about radiation therapy which involves external beam radiation therapy with IMRT to his prostate and seminal vesicles for 7000 cGy in 28 fractions and this will " be done with IGRT on a daily basis from Monday to Friday for a total of 28 treatments.    Side effects of radiation may include, but are not limited to: frequency, nocturia, dysuria,incontinence, diarrhea, rectal bleeding and sexual dysfunction.  The concerns of the patient and his daughter have been addressed and the patient agreed to proceed with radiation therapy.     Appropriate education was provided to the patient and explained in detail.  Aquaphor samples given.      I, Irma Kang MD, personally performed the services described in this documentation as scribed by the above named individual in my presence, and it is both accurate and complete.  3/10/2022  09:51 EST     Electronically signed by Irma Kang MD, 03/10/22, 9:51 AM EST.  Follow Up:   CT SIM scheduled for 3/23/2022  Covid swab to be performed today    Scribed for Dr. Irma Kang MD by RONY Gonzalez 3/10/2022  09:20 EST

## 2022-03-14 ENCOUNTER — PATIENT ROUNDING (BHMG ONLY) (OUTPATIENT)
Dept: ONCOLOGY | Facility: CLINIC | Age: 72
End: 2022-03-14

## 2022-03-22 ENCOUNTER — TELEPHONE (OUTPATIENT)
Dept: RADIATION ONCOLOGY | Facility: HOSPITAL | Age: 72
End: 2022-03-22

## 2022-03-22 NOTE — TELEPHONE ENCOUNTER
Called and spoke with Gabino, Cristopher's wife, to remind them about his CT Sim that is scheduled for tomorroe 3/22/22 at 8:30 AM. He is aware that he needs to come with Full Bladder and Empty Rectum. Gabino expressed full understanding with no questions at this time.

## 2022-03-23 ENCOUNTER — APPOINTMENT (OUTPATIENT)
Dept: RADIATION ONCOLOGY | Facility: HOSPITAL | Age: 72
End: 2022-03-23

## 2022-03-23 PROCEDURE — 77334 RADIATION TREATMENT AID(S): CPT | Performed by: RADIOLOGY

## 2022-03-28 NOTE — PROGRESS NOTES
Distress Screening Follow-up    Diagnosis: Prostate Cancer    Location of Distress Screening: Radiation Oncology    Distress Level: 0-No distress (3/10/2022  8:34 AM)    Physical Concerns:    Appearance: No  Bathing/Dressing: No  Breathing: No  Changes in urination: No  Constipation: No  Diarrhea: No  Eating: No  Fatigue: No  Feeling swollen: No  Fevers: No  Getting around: No  Indigestion: No  Memory/Concentration: No  Mouth sores: No  Nausea: No  Nose dry/congested: No  Pain: No  Sexual: No  Skin dry/itchy: No  Sleep: No  Substance abuse: No  Tingling hands/feet: No    Practical Problems:    : No  Food: No  Housing: No  Insurance/Financial: No  Transportation: No  Work/School: No  Treatment decisions: No    Emotional Concerns:    Depression: No  Nervousness: No  Sadness: No  Worry: No  Loss of Interest/Activities: No  Emotional Concerns Comment: No    Family Concerns:    Dealing with children: No  Dealing with partner: No  Ability to have children: No  Family health issues: No  Family Concerns Comment: No    Spiritual Concerns:    Spiritual/Mosque Concerns: No     Interventions:        Comments:    SS will follow and assist as needed for resource assistance and support.

## 2022-03-29 PROCEDURE — 77301 RADIOTHERAPY DOSE PLAN IMRT: CPT | Performed by: RADIOLOGY

## 2022-03-29 PROCEDURE — 77300 RADIATION THERAPY DOSE PLAN: CPT | Performed by: RADIOLOGY

## 2022-03-29 PROCEDURE — 77338 DESIGN MLC DEVICE FOR IMRT: CPT | Performed by: RADIOLOGY

## 2022-04-01 ENCOUNTER — APPOINTMENT (OUTPATIENT)
Dept: RADIATION ONCOLOGY | Facility: HOSPITAL | Age: 72
End: 2022-04-01

## 2022-04-04 PROCEDURE — 77014 CHG CT GUIDANCE RADIATION THERAPY FLDS PLACEMENT: CPT | Performed by: RADIOLOGY

## 2022-04-04 PROCEDURE — 77385: CPT | Performed by: RADIOLOGY

## 2022-04-05 ENCOUNTER — RADIATION ONCOLOGY WEEKLY ASSESSMENT (OUTPATIENT)
Dept: RADIATION ONCOLOGY | Facility: HOSPITAL | Age: 72
End: 2022-04-05

## 2022-04-05 VITALS
OXYGEN SATURATION: 98 % | HEART RATE: 62 BPM | DIASTOLIC BLOOD PRESSURE: 84 MMHG | TEMPERATURE: 98 F | RESPIRATION RATE: 18 BRPM | SYSTOLIC BLOOD PRESSURE: 169 MMHG

## 2022-04-05 DIAGNOSIS — C61 PROSTATE CANCER: Primary | ICD-10-CM

## 2022-04-05 PROCEDURE — 77385: CPT | Performed by: RADIOLOGY

## 2022-04-05 PROCEDURE — 77014 CHG CT GUIDANCE RADIATION THERAPY FLDS PLACEMENT: CPT | Performed by: RADIOLOGY

## 2022-04-05 NOTE — PROGRESS NOTES
OTV Note      Patient Name: Cristopher Chatterjee  : 1950   MRN: [id number]     Diagnosis: Prostate Cancer   Staging:  T2 NX MX    This patient was seen today for an on treatment visit. The patient is receiving radiation treatment to the prostate. The patient has received XRT Dose (cGy): 500 cGy in 2 fractions out of a planned dose of 7000 cGy in 28 fractions.       Subjective      Review of Systems:   Review of Systems   Constitutional: Negative.    HENT: Negative.    Eyes: Negative.    Respiratory: Negative.    Cardiovascular: Negative.    Gastrointestinal: Negative.  Negative for diarrhea.   Endocrine: Negative.    Genitourinary: Negative.  Negative for dysuria, hematuria, nocturia and urinary incontinence.   Musculoskeletal: Negative.    Skin: Negative.  Negative for color change.   Neurological: Negative.    Hematological: Negative.    Psychiatric/Behavioral: Negative.      Review of Systems   Constitutional: Negative.    HENT:  Negative.    Eyes: Negative.    Respiratory: Negative.    Cardiovascular: Negative.    Gastrointestinal: Negative.  Negative for diarrhea.   Endocrine: Negative.    Genitourinary: Negative.  Negative for bladder incontinence, dysuria, hematuria and nocturia.    Musculoskeletal: Negative.    Skin: Negative.  Negative for color change.   Neurological: Negative.    Hematological: Negative.    Psychiatric/Behavioral: Negative.        Medications:     Current Outpatient Medications:   •  allopurinol (ZYLOPRIM) 300 MG tablet, Take 300 mg by mouth Daily., Disp: , Rfl:   •  cetirizine (zyrTEC) 10 MG tablet, Take 10 mg by mouth Daily., Disp: , Rfl:   •  finasteride (PROSCAR) 5 MG tablet, TAKE 1 TABLET BY MOUTH DAILY, Disp: 90 tablet, Rfl: 3  •  lisinopril (PRINIVIL,ZESTRIL) 10 MG tablet, Take 10 mg by mouth Daily., Disp: , Rfl:   •  montelukast (SINGULAIR) 10 MG tablet, Take 10 mg by mouth Every Night., Disp: , Rfl:   •  pantoprazole (PROTONIX) 40 MG EC tablet, Take 1 tablet by mouth 2 (Two)  Times a Day., Disp: , Rfl:   •  tadalafil (Cialis) 5 MG tablet, Take 1 tablet by mouth Daily As Needed for Erectile Dysfunction. Take one Daily (Patient taking differently: Take 5 mg by mouth Daily. Take one Daily), Disp: 30 tablet, Rfl: 6    Allergies:   No Known Allergies      Objective     Physical Exam:  Physical Exam  Vitals reviewed.   Constitutional:       Appearance: Normal appearance.   Cardiovascular:      Rate and Rhythm: Normal rate and regular rhythm.      Pulses: Normal pulses.      Heart sounds: Normal heart sounds.   Pulmonary:      Effort: Pulmonary effort is normal.      Breath sounds: Normal breath sounds.   Skin:     General: Skin is warm and dry.   Neurological:      General: No focal deficit present.      Mental Status: He is alert and oriented to person, place, and time. Mental status is at baseline.   Psychiatric:         Mood and Affect: Mood normal.         Behavior: Behavior normal.         Thought Content: Thought content normal.         Vital Signs:   Vitals:    04/05/22 0835   BP: 169/84   Pulse: 62   Resp: 18   Temp: 98 °F (36.7 °C)   TempSrc: Temporal   SpO2: 98%   PainSc: 0-No pain     There is no height or weight on file to calculate BMI.     Current Total XRT Dose (cGY): XRT Dose (cGy): 500    Plan      Plan:   Patient was seen today for an on treatment visit. Patient is receiving radiation therapy to the prostate. Patient is stable and tolerating radiation therapy well with minimal side effects.  Patient is doing well with no complaints.  Continue with radiation therapy.     I have reviewed treatment setup notes, checked and approved the daily guidance images. I reviewed dose delivery, treatment parameters and deemed them appropriate. We plan to continue radiation therapy as prescribed.     I, Irma Kang MD, personally performed the services described in this documentation as scribed by the above named individual in my presence, and it is both accurate and complete.  4/5/2022   08:50 EDT     Electronically signed by Irma Kang MD, 04/05/22, 8:50 AM EDT.      Scribed for Dr. Irma Kang MD by Nazanin Flores, RONY 4/5/2022  08:40 EDT

## 2022-04-06 PROCEDURE — 77336 RADIATION PHYSICS CONSULT: CPT | Performed by: RADIOLOGY

## 2022-04-06 PROCEDURE — 77014 CHG CT GUIDANCE RADIATION THERAPY FLDS PLACEMENT: CPT | Performed by: RADIOLOGY

## 2022-04-06 PROCEDURE — 77385: CPT | Performed by: RADIOLOGY

## 2022-04-07 PROCEDURE — 77014 CHG CT GUIDANCE RADIATION THERAPY FLDS PLACEMENT: CPT | Performed by: RADIOLOGY

## 2022-04-07 PROCEDURE — 77385: CPT | Performed by: RADIOLOGY

## 2022-04-08 PROCEDURE — 77385: CPT | Performed by: RADIOLOGY

## 2022-04-08 PROCEDURE — 77014 CHG CT GUIDANCE RADIATION THERAPY FLDS PLACEMENT: CPT | Performed by: RADIOLOGY

## 2022-04-11 PROCEDURE — 77014 CHG CT GUIDANCE RADIATION THERAPY FLDS PLACEMENT: CPT | Performed by: RADIOLOGY

## 2022-04-11 PROCEDURE — 77385: CPT | Performed by: RADIOLOGY

## 2022-04-12 ENCOUNTER — RADIATION ONCOLOGY WEEKLY ASSESSMENT (OUTPATIENT)
Dept: RADIATION ONCOLOGY | Facility: HOSPITAL | Age: 72
End: 2022-04-12

## 2022-04-12 VITALS
HEART RATE: 60 BPM | OXYGEN SATURATION: 98 % | TEMPERATURE: 98.2 F | DIASTOLIC BLOOD PRESSURE: 86 MMHG | SYSTOLIC BLOOD PRESSURE: 156 MMHG | RESPIRATION RATE: 18 BRPM

## 2022-04-12 DIAGNOSIS — C61 PROSTATE CANCER: Primary | ICD-10-CM

## 2022-04-12 PROCEDURE — 77014 CHG CT GUIDANCE RADIATION THERAPY FLDS PLACEMENT: CPT | Performed by: RADIOLOGY

## 2022-04-12 PROCEDURE — 77385: CPT | Performed by: RADIOLOGY

## 2022-04-12 PROCEDURE — 77427 RADIATION TX MANAGEMENT X5: CPT | Performed by: RADIOLOGY

## 2022-04-12 NOTE — PROGRESS NOTES
OTV Note      Patient Name: Cristopher Chatterjee  : 1950   MRN: [id number]     Diagnosis:  Prostate Cancer   Staging:  T2 NX MX    This patient was seen today for an on treatment visit. The patient is receiving radiation treatment to the prostate. The patient has received XRT Dose (cGy): 1750 cGy in 7 fractions out of a planned dose of 7000 cGy in 28 fractions.       Subjective      Review of Systems:   Review of Systems   Constitutional: Negative.    HENT: Negative.    Eyes: Negative.    Respiratory: Negative.    Cardiovascular: Negative.    Gastrointestinal: Negative.  Negative for diarrhea.   Endocrine: Negative.    Genitourinary: Positive for frequency. Negative for dysuria, hematuria, nocturia, urgency and urinary incontinence.   Musculoskeletal: Negative.    Skin: Negative.  Negative for color change.   Neurological: Negative.    Hematological: Negative.    Psychiatric/Behavioral: Negative.      Review of Systems   Constitutional: Negative.    HENT:  Negative.    Eyes: Negative.    Respiratory: Negative.    Cardiovascular: Negative.    Gastrointestinal: Negative.  Negative for diarrhea.   Endocrine: Negative.    Genitourinary: Positive for frequency. Negative for bladder incontinence, dysuria, hematuria, nocturia and urgency.    Musculoskeletal: Negative.    Skin: Negative.  Negative for color change.   Neurological: Negative.    Hematological: Negative.    Psychiatric/Behavioral: Negative.        Medications:     Current Outpatient Medications:   •  allopurinol (ZYLOPRIM) 300 MG tablet, Take 300 mg by mouth Daily., Disp: , Rfl:   •  cetirizine (zyrTEC) 10 MG tablet, Take 10 mg by mouth Daily., Disp: , Rfl:   •  finasteride (PROSCAR) 5 MG tablet, TAKE 1 TABLET BY MOUTH DAILY, Disp: 90 tablet, Rfl: 3  •  lisinopril (PRINIVIL,ZESTRIL) 10 MG tablet, Take 10 mg by mouth Daily., Disp: , Rfl:   •  montelukast (SINGULAIR) 10 MG tablet, Take 10 mg by mouth Every Night., Disp: , Rfl:   •  pantoprazole (PROTONIX)  40 MG EC tablet, Take 1 tablet by mouth 2 (Two) Times a Day., Disp: , Rfl:   •  tadalafil (Cialis) 5 MG tablet, Take 1 tablet by mouth Daily As Needed for Erectile Dysfunction. Take one Daily (Patient taking differently: Take 5 mg by mouth Daily. Take one Daily), Disp: 30 tablet, Rfl: 6    Allergies:   No Known Allergies      Objective     Physical Exam:  Physical Exam  Vitals reviewed.   Constitutional:       Appearance: Normal appearance.   Cardiovascular:      Rate and Rhythm: Normal rate and regular rhythm.      Pulses: Normal pulses.      Heart sounds: Normal heart sounds.   Pulmonary:      Effort: Pulmonary effort is normal.      Breath sounds: Normal breath sounds.   Skin:     General: Skin is warm and dry.   Neurological:      General: No focal deficit present.      Mental Status: He is alert and oriented to person, place, and time. Mental status is at baseline.   Psychiatric:         Mood and Affect: Mood normal.         Behavior: Behavior normal.         Thought Content: Thought content normal.         Vital Signs:   Vitals:    04/12/22 0838   BP: 156/86   Pulse: 60   Resp: 18   Temp: 98.2 °F (36.8 °C)   TempSrc: Temporal   SpO2: 98%   PainSc: 0-No pain     There is no height or weight on file to calculate BMI.     Current Total XRT Dose (cGY): XRT Dose (cGy): 1750    Plan      Plan:   Patient was seen today for an on treatment visit. Patient is receiving radiation therapy to the prostate. Patient is stable and tolerating radiation therapy well with minimal side effects.  No new issues today.  He continues to have frequency, but no dysuria or incontinence.  Continue with radiation therapy.     I have reviewed treatment setup notes, checked and approved the daily guidance images. I reviewed dose delivery, treatment parameters and deemed them appropriate. We plan to continue radiation therapy as prescribed.       I, Irma Kang MD, personally performed the services described in this documentation as scribed  by the above named individual in my presence, and it is both accurate and complete.  4/12/2022  08:49 EDT     Electronically signed by Irma Kang MD, 04/12/22, 8:49 AM EDT.    Scribed for Dr. Irma Kang MD by RONY Gonzalez 4/12/2022  08:42 EDT

## 2022-04-13 ENCOUNTER — APPOINTMENT (OUTPATIENT)
Dept: RADIATION ONCOLOGY | Facility: HOSPITAL | Age: 72
End: 2022-04-13

## 2022-04-13 PROCEDURE — 77014 CHG CT GUIDANCE RADIATION THERAPY FLDS PLACEMENT: CPT | Performed by: RADIOLOGY

## 2022-04-13 PROCEDURE — 77385: CPT | Performed by: RADIOLOGY

## 2022-04-13 PROCEDURE — 77336 RADIATION PHYSICS CONSULT: CPT | Performed by: RADIOLOGY

## 2022-04-14 PROCEDURE — 77385: CPT | Performed by: RADIOLOGY

## 2022-04-14 PROCEDURE — 77014 CHG CT GUIDANCE RADIATION THERAPY FLDS PLACEMENT: CPT | Performed by: RADIOLOGY

## 2022-04-15 PROCEDURE — 77014 CHG CT GUIDANCE RADIATION THERAPY FLDS PLACEMENT: CPT

## 2022-04-15 PROCEDURE — 77385: CPT

## 2022-04-18 PROCEDURE — 77385: CPT | Performed by: RADIOLOGY

## 2022-04-18 PROCEDURE — 77014 CHG CT GUIDANCE RADIATION THERAPY FLDS PLACEMENT: CPT | Performed by: RADIOLOGY

## 2022-04-19 ENCOUNTER — RADIATION ONCOLOGY WEEKLY ASSESSMENT (OUTPATIENT)
Dept: RADIATION ONCOLOGY | Facility: HOSPITAL | Age: 72
End: 2022-04-19

## 2022-04-19 VITALS
HEART RATE: 66 BPM | OXYGEN SATURATION: 99 % | RESPIRATION RATE: 18 BRPM | SYSTOLIC BLOOD PRESSURE: 152 MMHG | TEMPERATURE: 97.7 F | DIASTOLIC BLOOD PRESSURE: 79 MMHG

## 2022-04-19 DIAGNOSIS — C61 PROSTATE CANCER: Primary | ICD-10-CM

## 2022-04-19 PROCEDURE — 77385: CPT | Performed by: RADIOLOGY

## 2022-04-19 PROCEDURE — 77427 RADIATION TX MANAGEMENT X5: CPT | Performed by: RADIOLOGY

## 2022-04-19 PROCEDURE — 77014 CHG CT GUIDANCE RADIATION THERAPY FLDS PLACEMENT: CPT | Performed by: RADIOLOGY

## 2022-04-19 NOTE — PROGRESS NOTES
OTV Note      Patient Name: Cristopher Chatterjee  : 1950   MRN: [id number]     Diagnosis:  Prostate Cancer   Staging: T2 NX MX    This patient was seen today for an on treatment visit. The patient is receiving radiation treatment to the prostate. The patient has received XRT Dose (cGy): 3000 cGy in 12 fractions out of a planned dose of 7000 cGy in 28 fractions.     Subjective      Review of Systems:   Review of Systems   Constitutional: Negative.    HENT: Negative.    Eyes: Negative.    Respiratory: Negative.    Cardiovascular: Negative.    Gastrointestinal: Positive for constipation. Negative for diarrhea.   Endocrine: Negative.    Genitourinary: Negative.  Negative for dysuria, frequency, urgency and urinary incontinence.   Musculoskeletal: Negative.    Skin: Negative.  Negative for color change.   Neurological: Negative.    Hematological: Negative.    Psychiatric/Behavioral: Negative.      Review of Systems   Constitutional: Negative.    HENT:  Negative.    Eyes: Negative.    Respiratory: Negative.    Cardiovascular: Negative.    Gastrointestinal: Positive for constipation. Negative for diarrhea.   Endocrine: Negative.    Genitourinary: Negative.  Negative for bladder incontinence, dysuria, frequency and urgency.    Musculoskeletal: Negative.    Skin: Negative.  Negative for color change.   Neurological: Negative.    Hematological: Negative.    Psychiatric/Behavioral: Negative.        Medications:     Current Outpatient Medications:   •  allopurinol (ZYLOPRIM) 300 MG tablet, Take 300 mg by mouth Daily., Disp: , Rfl:   •  cetirizine (zyrTEC) 10 MG tablet, Take 10 mg by mouth Daily., Disp: , Rfl:   •  finasteride (PROSCAR) 5 MG tablet, TAKE 1 TABLET BY MOUTH DAILY, Disp: 90 tablet, Rfl: 3  •  lisinopril (PRINIVIL,ZESTRIL) 10 MG tablet, Take 10 mg by mouth Daily., Disp: , Rfl:   •  montelukast (SINGULAIR) 10 MG tablet, Take 10 mg by mouth Every Night., Disp: , Rfl:   •  pantoprazole (PROTONIX) 40 MG EC tablet,  Take 1 tablet by mouth 2 (Two) Times a Day., Disp: , Rfl:   •  tadalafil (Cialis) 5 MG tablet, Take 1 tablet by mouth Daily As Needed for Erectile Dysfunction. Take one Daily (Patient taking differently: Take 5 mg by mouth Daily. Take one Daily), Disp: 30 tablet, Rfl: 6    Allergies:   No Known Allergies      Objective     Physical Exam:  Physical Exam  Vitals reviewed.   Constitutional:       Appearance: Normal appearance.   Cardiovascular:      Rate and Rhythm: Normal rate and regular rhythm.      Pulses: Normal pulses.      Heart sounds: Normal heart sounds.   Pulmonary:      Effort: Pulmonary effort is normal.      Breath sounds: Normal breath sounds.   Skin:     General: Skin is warm and dry.   Neurological:      General: No focal deficit present.      Mental Status: He is alert and oriented to person, place, and time. Mental status is at baseline.   Psychiatric:         Mood and Affect: Mood normal.         Behavior: Behavior normal.         Thought Content: Thought content normal.         Vital Signs:   Vitals:    04/19/22 0845   BP: 152/79   Pulse: 66   Resp: 18   Temp: 97.7 °F (36.5 °C)   TempSrc: Temporal   SpO2: 99%   PainSc: 0-No pain     There is no height or weight on file to calculate BMI.     Current Total XRT Dose (cGY): XRT Dose (cGy): 3000    Plan      Plan:   Patient was seen today for an on treatment visit. Patient is receiving radiation therapy to the prostate. Patient is stable and tolerating radiation therapy well with minimal side effects.  Patient had a episode of constipation last week.  He drank magnesium citrate which worked.  And he has been taking Metamucil daily to combat constipation.  No diarrhea at this time.  No urinary symptoms at this time.  Continue with radiation therapy.     I have reviewed treatment setup notes, checked and approved the daily guidance images. I reviewed dose delivery, treatment parameters and deemed them appropriate. We plan to continue radiation therapy as  prescribed.       I, Irma Kang MD, personally performed the services described in this documentation as scribed by the above named individual in my presence, and it is both accurate and complete.  4/19/2022  08:50 EDT     Electronically signed by Irma Kang MD, 04/19/22, 8:50 AM EDT.    Scribed for Dr. Irma Kang MD by RONY Gonzalez 4/19/2022  08:48 EDT

## 2022-04-20 PROCEDURE — 77014 CHG CT GUIDANCE RADIATION THERAPY FLDS PLACEMENT: CPT | Performed by: RADIOLOGY

## 2022-04-20 PROCEDURE — 77336 RADIATION PHYSICS CONSULT: CPT | Performed by: RADIOLOGY

## 2022-04-20 PROCEDURE — 77385: CPT | Performed by: RADIOLOGY

## 2022-04-21 PROCEDURE — 77014 CHG CT GUIDANCE RADIATION THERAPY FLDS PLACEMENT: CPT | Performed by: RADIOLOGY

## 2022-04-21 PROCEDURE — 77385: CPT | Performed by: RADIOLOGY

## 2022-04-22 ENCOUNTER — TELEPHONE (OUTPATIENT)
Dept: UROLOGY | Facility: CLINIC | Age: 72
End: 2022-04-22

## 2022-04-22 DIAGNOSIS — N40.0 BENIGN PROSTATIC HYPERPLASIA, UNSPECIFIED WHETHER LOWER URINARY TRACT SYMPTOMS PRESENT: ICD-10-CM

## 2022-04-22 PROCEDURE — 77385: CPT | Performed by: RADIOLOGY

## 2022-04-22 PROCEDURE — 77014 CHG CT GUIDANCE RADIATION THERAPY FLDS PLACEMENT: CPT | Performed by: RADIOLOGY

## 2022-04-22 RX ORDER — TADALAFIL 5 MG/1
5 TABLET ORAL DAILY PRN
Qty: 30 TABLET | Refills: 6 | Status: SHIPPED | OUTPATIENT
Start: 2022-04-22 | End: 2022-04-24 | Stop reason: SDUPTHER

## 2022-04-22 NOTE — TELEPHONE ENCOUNTER
Patient called stating patient needs a refill please on Cialis sent to [facility] pharmacy

## 2022-04-22 NOTE — TELEPHONE ENCOUNTER
Patient said prescription was supposed to go to [facility] pharmacy in [place], asking that it be transferred.

## 2022-04-24 DIAGNOSIS — N40.0 BENIGN PROSTATIC HYPERPLASIA, UNSPECIFIED WHETHER LOWER URINARY TRACT SYMPTOMS PRESENT: ICD-10-CM

## 2022-04-24 RX ORDER — TADALAFIL 5 MG/1
5 TABLET ORAL DAILY PRN
Qty: 30 TABLET | Refills: 6 | Status: SHIPPED | OUTPATIENT
Start: 2022-04-24 | End: 2022-11-29 | Stop reason: SDUPTHER

## 2022-04-24 RX ORDER — FINASTERIDE 5 MG/1
5 TABLET, FILM COATED ORAL DAILY
Qty: 90 TABLET | Refills: 3 | Status: SHIPPED | OUTPATIENT
Start: 2022-04-24

## 2022-04-25 PROCEDURE — 77014 CHG CT GUIDANCE RADIATION THERAPY FLDS PLACEMENT: CPT | Performed by: RADIOLOGY

## 2022-04-25 PROCEDURE — 77385: CPT | Performed by: RADIOLOGY

## 2022-04-26 ENCOUNTER — DOCUMENTATION (OUTPATIENT)
Dept: ONCOLOGY | Facility: HOSPITAL | Age: 72
End: 2022-04-26

## 2022-04-26 ENCOUNTER — RADIATION ONCOLOGY WEEKLY ASSESSMENT (OUTPATIENT)
Dept: RADIATION ONCOLOGY | Facility: HOSPITAL | Age: 72
End: 2022-04-26

## 2022-04-26 VITALS
SYSTOLIC BLOOD PRESSURE: 167 MMHG | OXYGEN SATURATION: 99 % | RESPIRATION RATE: 18 BRPM | HEART RATE: 58 BPM | DIASTOLIC BLOOD PRESSURE: 83 MMHG | TEMPERATURE: 97.5 F

## 2022-04-26 DIAGNOSIS — C61 PROSTATE CANCER: Primary | ICD-10-CM

## 2022-04-26 PROCEDURE — 77385: CPT | Performed by: RADIOLOGY

## 2022-04-26 PROCEDURE — 77427 RADIATION TX MANAGEMENT X5: CPT | Performed by: RADIOLOGY

## 2022-04-26 PROCEDURE — 77014 CHG CT GUIDANCE RADIATION THERAPY FLDS PLACEMENT: CPT | Performed by: RADIOLOGY

## 2022-04-26 NOTE — PROGRESS NOTES
OTV Note      Patient Name: Cristopher Chatterjee  : 1950   MRN: [id number]     Diagnosis:    Chief Complaint   Patient presents with   • Prostate Cancer      Staging:  T2 NX MX    This patient was seen today for an on treatment visit. The patient is receiving radiation treatment to the prostate. The patient has received XRT Dose (cGy): 4205 cGy in 17 fractions out of a planned dose of 7000 cGy in 28 fractions.     Subjective      Review of Systems:   Review of Systems   Constitutional: Negative.    HENT: Negative.    Eyes: Negative.    Respiratory: Negative.    Cardiovascular: Negative.    Gastrointestinal: Negative.  Negative for diarrhea.   Endocrine: Negative.    Genitourinary: Positive for urinary incontinence (dribbling before he gets to the restroom). Negative for difficulty urinating, dysuria, frequency, hematuria and nocturia.   Musculoskeletal: Negative.    Skin: Negative.  Negative for color change.   Neurological: Negative.    Hematological: Negative.    Psychiatric/Behavioral: Negative.      Review of Systems   Constitutional: Negative.    HENT:  Negative.    Eyes: Negative.    Respiratory: Negative.    Cardiovascular: Negative.    Gastrointestinal: Negative.  Negative for diarrhea.   Endocrine: Negative.    Genitourinary: Positive for bladder incontinence (dribbling before he gets to the restroom). Negative for difficulty urinating, dysuria, frequency, hematuria and nocturia.    Musculoskeletal: Negative.    Skin: Negative.  Negative for color change.   Neurological: Negative.    Hematological: Negative.    Psychiatric/Behavioral: Negative.        Medications:     Current Outpatient Medications:   •  allopurinol (ZYLOPRIM) 300 MG tablet, Take 300 mg by mouth Daily., Disp: , Rfl:   •  cetirizine (zyrTEC) 10 MG tablet, Take 10 mg by mouth Daily., Disp: , Rfl:   •  finasteride (PROSCAR) 5 MG tablet, Take 1 tablet by mouth Daily., Disp: 90 tablet, Rfl: 3  •  lisinopril (PRINIVIL,ZESTRIL) 10 MG tablet,  Take 10 mg by mouth Daily., Disp: , Rfl:   •  montelukast (SINGULAIR) 10 MG tablet, Take 10 mg by mouth Every Night., Disp: , Rfl:   •  pantoprazole (PROTONIX) 40 MG EC tablet, Take 1 tablet by mouth 2 (Two) Times a Day., Disp: , Rfl:   •  tadalafil (Cialis) 5 MG tablet, Take 1 tablet by mouth Daily As Needed for Erectile Dysfunction. Take one Daily, Disp: 30 tablet, Rfl: 6    Allergies:   No Known Allergies      Objective     Physical Exam:  Physical Exam  Vitals reviewed.   Constitutional:       Appearance: Normal appearance.   Cardiovascular:      Rate and Rhythm: Normal rate and regular rhythm.      Pulses: Normal pulses.      Heart sounds: Normal heart sounds.   Pulmonary:      Effort: Pulmonary effort is normal.      Breath sounds: Normal breath sounds.   Skin:     General: Skin is warm and dry.   Neurological:      General: No focal deficit present.      Mental Status: He is alert and oriented to person, place, and time. Mental status is at baseline.   Psychiatric:         Mood and Affect: Mood normal.         Behavior: Behavior normal.         Thought Content: Thought content normal.         Vital Signs:   Vitals:    04/26/22 0824   BP: 167/83   Pulse: 58   Resp: 18   Temp: 97.5 °F (36.4 °C)   TempSrc: Temporal   SpO2: 99%   PainSc: 0-No pain     There is no height or weight on file to calculate BMI.     Current Total XRT Dose (cGY): XRT Dose (cGy): 4205    Plan      Plan:   Patient was seen today for an on treatment visit. Patient is receiving radiation therapy to the prostate. Patient is stable and tolerating radiation therapy well with minimal side effects.  Today the patient's only complaints are some urinary incontinence.  He states that he has the sensation to use the restroom, starts to dribble before he is ready.  No dysuria during urination.  Continue with radiation therapy.     I have reviewed treatment setup notes, checked and approved the daily guidance images. I reviewed dose delivery, treatment  parameters and deemed them appropriate. We plan to continue radiation therapy as prescribed.       I, Irma Kang MD, personally performed the services described in this documentation as scribed by the above named individual in my presence, and it is both accurate and complete.  4/26/2022  08:40 EDT     Electronically signed by Irma Kang MD, 04/26/22, 8:40 AM EDT.    Scribed for Dr. Irma Kang MD by RONY Gonzalez 4/26/2022  08:35 EDT

## 2022-04-26 NOTE — PROGRESS NOTES
"Patient is 73 Y/O white male diagnosed with Prostate Cancer.      SS met with patient and spouse Gabino this date.    Patient lives at home with spouse Gabino.    Patient doesn't utilize any home health.    Patient doesn't utilize any durable medical equipment.    Advance Care Planning:  The patient and I discussed care planning \"Conversations that Matter.\" This service was offered, free of charge, for development of advance directives with a certified ACP facilitator. The patient does not have an up-to-date advance directive. The patient is not interested in an appointment with one of our facilitators to create or update their advanced directives.    Patient has two son's: Hector and Barber.    Distress Screening Follow-up    Diagnosis: Prostate Cancer    Location of Distress Screening: [facility]    Distress Level: 0-No distress (3/10/2022  8:34 AM)    Physical Concerns:    Appearance: No  Bathing/Dressing: No  Breathing: No  Changes in urination: No  Constipation: No  Diarrhea: No  Eating: No  Fatigue: No  Feeling swollen: No  Fevers: No  Getting around: No  Indigestion: No  Memory/Concentration: No  Mouth sores: No  Nausea: No  Nose dry/congested: No  Pain: No  Sexual: No  Skin dry/itchy: No  Sleep: No  Substance abuse: No  Tingling hands/feet: No    Practical Problems:    : No  Food: No  Housing: No  Insurance/Financial: No  Transportation: No  Work/School: No  Treatment decisions: No    Emotional Concerns:    Depression: No  Nervousness: No  Sadness: No  Worry: No  Loss of Interest/Activities: No  Emotional Concerns Comment: No    Family Concerns:    Dealing with children: No  Dealing with partner: No  Ability to have children: No  Family health issues: No  Family Concerns Comment: No    Spiritual Concerns:    Spiritual/Rastafari Concerns: No     Interventions: Patient declines any services at this time.    Patient lives at [address] [place] [address].    The " patient travels about 90 miles round trip.    SS completed application for [employer] program.    SS will follow and assist as needed.

## 2022-04-27 PROCEDURE — 77385: CPT | Performed by: RADIOLOGY

## 2022-04-27 PROCEDURE — 77014 CHG CT GUIDANCE RADIATION THERAPY FLDS PLACEMENT: CPT | Performed by: RADIOLOGY

## 2022-04-27 PROCEDURE — 77336 RADIATION PHYSICS CONSULT: CPT | Performed by: RADIOLOGY

## 2022-04-28 PROCEDURE — 77014 CHG CT GUIDANCE RADIATION THERAPY FLDS PLACEMENT: CPT | Performed by: RADIOLOGY

## 2022-04-28 PROCEDURE — 77385: CPT | Performed by: RADIOLOGY

## 2022-04-29 PROCEDURE — 77385: CPT

## 2022-04-29 PROCEDURE — 77014 CHG CT GUIDANCE RADIATION THERAPY FLDS PLACEMENT: CPT

## 2022-05-02 ENCOUNTER — OFFICE VISIT (OUTPATIENT)
Dept: RADIATION ONCOLOGY | Facility: HOSPITAL | Age: 72
End: 2022-05-02

## 2022-05-02 PROCEDURE — 77385: CPT | Performed by: RADIOLOGY

## 2022-05-02 PROCEDURE — 77014 CHG CT GUIDANCE RADIATION THERAPY FLDS PLACEMENT: CPT | Performed by: RADIOLOGY

## 2022-05-03 ENCOUNTER — RADIATION ONCOLOGY WEEKLY ASSESSMENT (OUTPATIENT)
Dept: RADIATION ONCOLOGY | Facility: HOSPITAL | Age: 72
End: 2022-05-03

## 2022-05-03 VITALS
HEART RATE: 68 BPM | TEMPERATURE: 97.8 F | DIASTOLIC BLOOD PRESSURE: 79 MMHG | RESPIRATION RATE: 18 BRPM | SYSTOLIC BLOOD PRESSURE: 155 MMHG | OXYGEN SATURATION: 96 %

## 2022-05-03 DIAGNOSIS — C61 PROSTATE CANCER: Primary | ICD-10-CM

## 2022-05-03 PROCEDURE — 77014 CHG CT GUIDANCE RADIATION THERAPY FLDS PLACEMENT: CPT | Performed by: RADIOLOGY

## 2022-05-03 PROCEDURE — 77427 RADIATION TX MANAGEMENT X5: CPT | Performed by: RADIOLOGY

## 2022-05-03 PROCEDURE — 77385: CPT | Performed by: RADIOLOGY

## 2022-05-03 NOTE — PROGRESS NOTES
OTV Note      Patient Name: Cristopher Chatterjee  : 1950   MRN: [id number]     Diagnosis:    Chief Complaint   Patient presents with   • Prostate Cancer     Staging:  T2 NX MX    This patient was seen today for an on treatment visit. The patient is receiving radiation treatment to the prostate. The patient has received XRT Dose (cGy): 5500 cGy in 22 fractions out of a planned dose of 7000 cGy in 28 fractions.     Subjective      Review of Systems:   Review of Systems   Constitutional: Negative.  Negative for fatigue.   HENT: Negative.    Eyes: Negative.    Respiratory: Negative.    Cardiovascular: Negative.    Gastrointestinal: Negative.  Negative for diarrhea.   Endocrine: Negative.    Genitourinary: Negative.  Negative for dysuria, frequency, nocturia, urgency and urinary incontinence.   Musculoskeletal: Negative.    Skin: Negative.  Negative for color change.   Neurological: Negative.    Hematological: Negative.    Psychiatric/Behavioral: Negative.      Review of Systems   Constitutional: Negative.  Negative for fatigue.   HENT:  Negative.    Eyes: Negative.    Respiratory: Negative.    Cardiovascular: Negative.    Gastrointestinal: Negative.  Negative for diarrhea.   Endocrine: Negative.    Genitourinary: Negative.  Negative for bladder incontinence, dysuria, frequency, nocturia and urgency.    Musculoskeletal: Negative.    Skin: Negative.  Negative for color change.   Neurological: Negative.    Hematological: Negative.    Psychiatric/Behavioral: Negative.        Medications:     Current Outpatient Medications:   •  allopurinol (ZYLOPRIM) 300 MG tablet, Take 300 mg by mouth Daily., Disp: , Rfl:   •  cetirizine (zyrTEC) 10 MG tablet, Take 10 mg by mouth Daily., Disp: , Rfl:   •  finasteride (PROSCAR) 5 MG tablet, Take 1 tablet by mouth Daily., Disp: 90 tablet, Rfl: 3  •  lisinopril (PRINIVIL,ZESTRIL) 10 MG tablet, Take 10 mg by mouth Daily., Disp: , Rfl:   •  montelukast (SINGULAIR) 10 MG tablet, Take 10 mg  by mouth Every Night., Disp: , Rfl:   •  pantoprazole (PROTONIX) 40 MG EC tablet, Take 1 tablet by mouth 2 (Two) Times a Day., Disp: , Rfl:   •  tadalafil (Cialis) 5 MG tablet, Take 1 tablet by mouth Daily As Needed for Erectile Dysfunction. Take one Daily, Disp: 30 tablet, Rfl: 6    Allergies:   No Known Allergies    Objective     Physical Exam:  Physical Exam  Vitals reviewed.   Constitutional:       Appearance: Normal appearance.   Cardiovascular:      Rate and Rhythm: Normal rate and regular rhythm.      Pulses: Normal pulses.      Heart sounds: Normal heart sounds.   Pulmonary:      Effort: Pulmonary effort is normal.      Breath sounds: Normal breath sounds.   Skin:     General: Skin is warm and dry.   Neurological:      General: No focal deficit present.      Mental Status: He is alert and oriented to person, place, and time. Mental status is at baseline.   Psychiatric:         Mood and Affect: Mood normal.         Behavior: Behavior normal.         Thought Content: Thought content normal.         Vital Signs:   Vitals:    05/03/22 0841   BP: 155/79   Pulse: 68   Resp: 18   Temp: 97.8 °F (36.6 °C)   TempSrc: Temporal   SpO2: 96%   PainSc: 0-No pain     There is no height or weight on file to calculate BMI.     Current Total XRT Dose (cGY): XRT Dose (cGy): 5500    Plan      Plan:   Patient was seen today for an on treatment visit. Patient is receiving radiation therapy to the prostate. Patient is stable and tolerating radiation therapy well with minimal side effects.  No new issues today.  Continue with radiation therapy.     I have reviewed treatment setup notes, checked and approved the daily guidance images. I reviewed dose delivery, treatment parameters and deemed them appropriate. We plan to continue radiation therapy as prescribed.     I, Irma Kang MD, personally performed the services described in this documentation as scribed by the above named individual in my presence, and it is both accurate and  complete.  5/3/2022  08:52 EDT     I, Irma Kang MD, personally performed the services described in this documentation as scribed by the above named individual in my presence, and it is both accurate and complete.  5/3/2022  08:59 EDT       Electronically signed by Irma Kang MD, 05/03/22, 8:59 AM EDT.      Scribed for Dr. Irma Kang MD by RONY Gonzalez 5/3/2022  08:42 EDT

## 2022-05-04 PROCEDURE — 77385: CPT | Performed by: RADIOLOGY

## 2022-05-04 PROCEDURE — 77014 CHG CT GUIDANCE RADIATION THERAPY FLDS PLACEMENT: CPT | Performed by: RADIOLOGY

## 2022-05-04 PROCEDURE — 77336 RADIATION PHYSICS CONSULT: CPT | Performed by: RADIOLOGY

## 2022-05-05 PROCEDURE — 77385: CPT | Performed by: RADIOLOGY

## 2022-05-05 PROCEDURE — 77014 CHG CT GUIDANCE RADIATION THERAPY FLDS PLACEMENT: CPT | Performed by: RADIOLOGY

## 2022-05-06 PROCEDURE — 77014 CHG CT GUIDANCE RADIATION THERAPY FLDS PLACEMENT: CPT | Performed by: RADIOLOGY

## 2022-05-06 PROCEDURE — 77385: CPT | Performed by: RADIOLOGY

## 2022-05-09 PROCEDURE — 77014 CHG CT GUIDANCE RADIATION THERAPY FLDS PLACEMENT: CPT | Performed by: RADIOLOGY

## 2022-05-09 PROCEDURE — 77385: CPT | Performed by: RADIOLOGY

## 2022-05-10 ENCOUNTER — RADIATION ONCOLOGY WEEKLY ASSESSMENT (OUTPATIENT)
Dept: RADIATION ONCOLOGY | Facility: HOSPITAL | Age: 72
End: 2022-05-10

## 2022-05-10 VITALS
TEMPERATURE: 97.7 F | SYSTOLIC BLOOD PRESSURE: 162 MMHG | HEART RATE: 50 BPM | DIASTOLIC BLOOD PRESSURE: 78 MMHG | OXYGEN SATURATION: 94 % | RESPIRATION RATE: 18 BRPM

## 2022-05-10 DIAGNOSIS — C61 PROSTATE CANCER: Primary | ICD-10-CM

## 2022-05-10 PROCEDURE — 77385: CPT | Performed by: RADIOLOGY

## 2022-05-10 PROCEDURE — 77014 CHG CT GUIDANCE RADIATION THERAPY FLDS PLACEMENT: CPT | Performed by: RADIOLOGY

## 2022-05-10 NOTE — PROGRESS NOTES
OTV Note    Patient Name: Cristopher Chatterjee  : 1950   MRN: [id number]   Diagnosis:    Chief Complaint   Patient presents with   • Prostate Cancer   Staging:  T2 NX MX  This patient was seen today for an on treatment visit. The patient is receiving radiation treatment to the prostate. The patient has received XRT Dose (cGy): 6750 cGy in 27 fractions out of a planned dose of 7000 cGy in 28 fractions.   Subjective   Review of Systems:   Review of Systems   Constitutional: Negative.  Negative for fatigue.   HENT: Negative.    Eyes: Negative.    Respiratory: Negative.    Cardiovascular: Negative.    Gastrointestinal: Negative.  Negative for diarrhea.   Endocrine: Negative.    Genitourinary: Negative.  Negative for dysuria, frequency and hematuria.   Musculoskeletal: Negative.    Skin: Negative.  Negative for color change.   Neurological: Negative.    Hematological: Negative.    Psychiatric/Behavioral: Negative.    Medications:     Current Outpatient Medications:   •  allopurinol (ZYLOPRIM) 300 MG tablet, Take 300 mg by mouth Daily., Disp: , Rfl:   •  cetirizine (zyrTEC) 10 MG tablet, Take 10 mg by mouth Daily., Disp: , Rfl:   •  finasteride (PROSCAR) 5 MG tablet, Take 1 tablet by mouth Daily., Disp: 90 tablet, Rfl: 3  •  lisinopril (PRINIVIL,ZESTRIL) 10 MG tablet, Take 10 mg by mouth Daily., Disp: , Rfl:   •  montelukast (SINGULAIR) 10 MG tablet, Take 10 mg by mouth Every Night., Disp: , Rfl:   •  pantoprazole (PROTONIX) 40 MG EC tablet, Take 1 tablet by mouth 2 (Two) Times a Day., Disp: , Rfl:   •  tadalafil (Cialis) 5 MG tablet, Take 1 tablet by mouth Daily As Needed for Erectile Dysfunction. Take one Daily, Disp: 30 tablet, Rfl: 6  Allergies: No Known Allergies  Objective   Physical Exam:  Physical Exam  Vitals reviewed.   Constitutional:       Appearance: Normal appearance.   Cardiovascular:      Rate and Rhythm: Normal rate and regular rhythm.      Pulses: Normal pulses.      Heart sounds: Normal heart  sounds.   Pulmonary:      Effort: Pulmonary effort is normal.      Breath sounds: Normal breath sounds.   Skin:     General: Skin is warm and dry.   Neurological:      General: No focal deficit present.      Mental Status: He is alert and oriented to person, place, and time. Mental status is at baseline.   Psychiatric:         Mood and Affect: Mood normal.         Behavior: Behavior normal.         Thought Content: Thought content normal.     Vital Signs:   Vitals:    05/10/22 0831   BP: 162/78   Pulse: 50   Resp: 18   Temp: 97.7 °F (36.5 °C)   TempSrc: Temporal   SpO2: 94%   PainSc: 0-No pain     There is no height or weight on file to calculate BMI.   Current Total XRT Dose (cGY): XRT Dose (cGy): 6750  Plan    Plan: Patient was seen today for an on treatment visit. Patient is receiving radiation therapy to the prostate. Patient is stable and tolerating radiation therapy well with minimal side effects.  No new issues today.  He has 1 treatment left.  Continue with radiation therapy. I have reviewed treatment setup notes, checked and approved the daily guidance images. I reviewed dose delivery, treatment parameters and deemed them appropriate. We plan to continue radiation therapy as prescribed.   Scribed for Adam Goss MD by RONY Gonzalez 5/10/2022  08:40 EDT

## 2022-05-11 PROCEDURE — 77336 RADIATION PHYSICS CONSULT: CPT | Performed by: RADIOLOGY

## 2022-05-11 PROCEDURE — 77014 CHG CT GUIDANCE RADIATION THERAPY FLDS PLACEMENT: CPT | Performed by: RADIOLOGY

## 2022-05-11 PROCEDURE — 77385: CPT | Performed by: RADIOLOGY

## 2022-05-24 ENCOUNTER — OFFICE VISIT (OUTPATIENT)
Dept: UROLOGY | Facility: CLINIC | Age: 72
End: 2022-05-24

## 2022-05-24 VITALS — BODY MASS INDEX: 25.48 KG/M2 | WEIGHT: 172 LBS | HEIGHT: 69 IN

## 2022-05-24 DIAGNOSIS — C61 PROSTATE CANCER: Primary | ICD-10-CM

## 2022-05-24 LAB — PSA SERPL-MCNC: 2.48 NG/ML (ref 0–4)

## 2022-05-24 PROCEDURE — 36415 COLL VENOUS BLD VENIPUNCTURE: CPT | Performed by: UROLOGY

## 2022-05-24 PROCEDURE — 99213 OFFICE O/P EST LOW 20 MIN: CPT | Performed by: UROLOGY

## 2022-05-24 PROCEDURE — 84153 ASSAY OF PSA TOTAL: CPT | Performed by: UROLOGY

## 2022-05-26 NOTE — PROGRESS NOTES
Chief Complaint:          Chief Complaint   Patient presents with   • Follow-up     Prostate cancer        HPI:   72 y.o. male returns today.  He is in the midst of radiation actually finished on 5-11.  He is doing great absolutely no symptoms, no burning, blood no rectal irritation PSA is pending I will see him back in 6 months      Past Medical History:        Past Medical History:   Diagnosis Date   • Benign prostatic hyperplasia without lower urinary tract symptoms    • Elevated PSA    • Erectile dysfunction    • Hypertension    • Kidney stone    • Prostate cancer (HCC) 2/7/2022         Current Meds:     Current Outpatient Medications   Medication Sig Dispense Refill   • allopurinol (ZYLOPRIM) 300 MG tablet Take 300 mg by mouth Daily.     • cetirizine (zyrTEC) 10 MG tablet Take 10 mg by mouth Daily.     • finasteride (PROSCAR) 5 MG tablet Take 1 tablet by mouth Daily. 90 tablet 3   • lisinopril (PRINIVIL,ZESTRIL) 10 MG tablet Take 10 mg by mouth Daily.     • montelukast (SINGULAIR) 10 MG tablet Take 10 mg by mouth Every Night.     • pantoprazole (PROTONIX) 40 MG EC tablet Take 1 tablet by mouth 2 (Two) Times a Day.     • tadalafil (Cialis) 5 MG tablet Take 1 tablet by mouth Daily As Needed for Erectile Dysfunction. Take one Daily 30 tablet 6     No current facility-administered medications for this visit.        Allergies:      No Known Allergies     Past Surgical History:     Past Surgical History:   Procedure Laterality Date   • KIDNEY STONE SURGERY     • PARTIAL KNEE ARTHROPLASTY Right          Social History:     Social History     Socioeconomic History   • Marital status:    Tobacco Use   • Smoking status: Never Smoker   • Smokeless tobacco: Never Used   Vaping Use   • Vaping Use: Never used   Substance and Sexual Activity   • Alcohol use: Yes     Alcohol/week: 0.0 standard drinks     Comment: Occasionally beer   • Drug use: No   • Sexual activity: Yes     Partners: Female     Birth  control/protection: None       Family History:     Family History   Problem Relation Age of Onset   • No Known Problems Father    • Heart disease Mother        Review of Systems:     Review of Systems   Constitutional: Negative.    HENT: Negative.    Eyes: Negative.    Respiratory: Negative.    Cardiovascular: Negative.    Gastrointestinal: Negative.    Endocrine: Negative.    Musculoskeletal: Negative.    Allergic/Immunologic: Negative.    Neurological: Negative.    Hematological: Negative.    Psychiatric/Behavioral: Negative.        Physical Exam:     Physical Exam  Vitals and nursing note reviewed.   Constitutional:       Appearance: He is well-developed.   HENT:      Head: Normocephalic and atraumatic.   Eyes:      Conjunctiva/sclera: Conjunctivae normal.      Pupils: Pupils are equal, round, and reactive to light.   Cardiovascular:      Rate and Rhythm: Normal rate and regular rhythm.      Heart sounds: Normal heart sounds.   Pulmonary:      Effort: Pulmonary effort is normal.      Breath sounds: Normal breath sounds.   Abdominal:      General: Bowel sounds are normal.      Palpations: Abdomen is soft.   Musculoskeletal:         General: Normal range of motion.      Cervical back: Normal range of motion.   Skin:     General: Skin is warm and dry.   Neurological:      Mental Status: He is alert and oriented to person, place, and time.      Deep Tendon Reflexes: Reflexes are normal and symmetric.   Psychiatric:         Behavior: Behavior normal.         Thought Content: Thought content normal.         Judgment: Judgment normal.         I have reviewed the following portions of the patient's history: Allergies, current medications, past family history, past medical history, past social history, past surgical history, problem list, and ROS and confirm it is accurate.      Procedure:       Assessment/Plan:   Prostate cancer:  He returns today status post biopsy for extensive discussion of prostate cancer.  We  discussed staging and grading of the disease.  I described with a [place] system being from a 2 to10 scale with the most common low-grade pattern being a [place] 6.  I discussed the staging workup including a total body bone scan and CT scan especially with a PSA greater than 10.  We discussed the various options at length. I discussed a radical retropubic prostatectomy done in the traditional fashion and using the robotic technique.  I then discussed radiation treatment both seed therapy and external beam therapy using Gold fiduciary markers.  We talked about some of the other alternatives such as a cryosurgical ablation at high intensity focused ultrasound as being viable alternatives but not recommended at this time.  He focused on aggressive watchful waiting and explained that currently low literature it's been discovered that a lot of men can actually observe it especially with numerous other comorbidities cannot have problems from the cancer by itself.  Talked about hormonal ablation and the side effects of creation of insulin resistance.  Overall, the patient was given appropriate literature, is going to think about it, and I'm going to revisit the topic with them after the next office visit.  He just finished a course of radiotherapy doing well completely asymptomatic PSA is currently pending we will notify him of same we will see him back in 6 months                    This document has been electronically signed by JOSE BARTH MD May 26, 2022 05:57 EDT

## 2022-06-03 ENCOUNTER — DOCUMENTATION (OUTPATIENT)
Dept: ONCOLOGY | Facility: HOSPITAL | Age: 72
End: 2022-06-03

## 2022-06-03 NOTE — PROGRESS NOTES
SS contacted patient's home [phone] to make aware that [employer] gas card being mailed to patient's home today.  Patient completed radiation.  Application completed on April 26, 2022.    Patient continues to have additional appointments that gas card will be needed.    SS will follow.

## 2022-06-13 ENCOUNTER — APPOINTMENT (OUTPATIENT)
Dept: RADIATION ONCOLOGY | Facility: HOSPITAL | Age: 72
End: 2022-06-13

## 2022-06-20 ENCOUNTER — OFFICE VISIT (OUTPATIENT)
Dept: RADIATION ONCOLOGY | Facility: HOSPITAL | Age: 72
End: 2022-06-20

## 2022-06-20 ENCOUNTER — APPOINTMENT (OUTPATIENT)
Dept: RADIATION ONCOLOGY | Facility: HOSPITAL | Age: 72
End: 2022-06-20

## 2022-06-20 VITALS
SYSTOLIC BLOOD PRESSURE: 175 MMHG | TEMPERATURE: 97.9 F | RESPIRATION RATE: 18 BRPM | OXYGEN SATURATION: 96 % | DIASTOLIC BLOOD PRESSURE: 79 MMHG | WEIGHT: 176 LBS | BODY MASS INDEX: 25.98 KG/M2 | HEART RATE: 61 BPM

## 2022-06-20 DIAGNOSIS — C61 PROSTATE CANCER: Primary | ICD-10-CM

## 2022-06-20 PROCEDURE — 99212-NC PR NO CHARGE CBC OFFICE OUTPATIENT VISIT 10 MINUTES

## 2022-06-20 PROCEDURE — G0463 HOSPITAL OUTPT CLINIC VISIT: HCPCS

## 2022-06-20 NOTE — PROGRESS NOTES
Office Follow Up Note      Patient Name: Cristopher Chatterjee  : 1950   MRN: [id number]     Requesting Physician: Jayson Mccormick,*    Chief Complaint:  Prostate Cancer   Staging: T2 NX MX    History of Present Illness: Cristopher Chatterjee is a pleasant 72 y.o. male who is here today for follow up after completing radiation therapy.     He has been seeing Dr. Mccormick for 2 years, initially for BPH.  He was checking his PSA regularly.  Recently had an increase from 4.7 to 6.3.  Dr. Mccormick performed a biopsy on 2022.  Pathology revealed Dexter 3+3 = 6 to the right apex and left base, and a Dexter 3+4 = 7 to the left apex in the left mid.  A bone scan was performed on 2/15/2022 which was negative.  And a CT of the abdomen pelvis was previously done on 2022 and this was negative as well.      Interval History:  Patient is here today around a little over a month since his last radiation treatment.  He started his treatments on 2022 and completed them on 2022.  He received a total of 7000 cGy in 28 fractions to the prostate and seminal vesicles.  At his last OTV he had no issues, and had no issues throughout treatment.  The patient recently saw Dr. Mccormick on 2022 and ashish a PSA.  PSA resulted as 2.48.  The patient is not on any HRT I will follow-up with Dr. Mccormick in 6 months.    Subjective      Review of Systems:   Review of Systems   Constitutional: Negative.  Negative for fatigue.   HENT:  Negative.    Eyes: Negative.    Respiratory: Negative.    Cardiovascular: Negative.    Gastrointestinal: Negative.  Negative for diarrhea.   Endocrine: Negative.    Genitourinary: Negative.  Negative for bladder incontinence, decreased urine volume, difficulty urinating, dysuria, frequency, hematuria and nocturia.    Musculoskeletal: Negative.    Skin: Negative for color change.   Neurological: Negative.    Hematological: Negative.    Psychiatric/Behavioral: Negative.      Review of Systems    Constitutional: Negative.  Negative for fatigue.   HENT: Negative.    Eyes: Negative.    Respiratory: Negative.    Cardiovascular: Negative.    Gastrointestinal: Negative.  Negative for diarrhea.   Endocrine: Negative.    Genitourinary: Negative.  Negative for decreased urine volume, difficulty urinating, dysuria, frequency, hematuria, nocturia and urinary incontinence.   Musculoskeletal: Negative.    Skin: Negative for color change and dry skin.   Neurological: Negative.    Hematological: Negative.    Psychiatric/Behavioral: Negative.        I have reviewed and confirmed the accuracy of the ROS as documented by the MA/LPN/RN RONY Ko     The following portions of the patient's history were reviewed and updated as appropriate: allergies, current medications, past family history, past medical history, past social history, past surgical history and problem list.    Past Oncology History:   Oncology/Hematology History   Prostate cancer (HCC)   2/7/2022 Initial Diagnosis    Prostate cancer (HCC)     4/4/2022 - 5/11/2022 Radiation    Radiation OncologyTreatment Course:  Cristopher Chatterjee received 7000 cGy in 28 fractions to prostate via External Beam Radiation - EBRT.          PHQ-9 Depression Screening  Little interest or pleasure in doing things?     Feeling down, depressed, or hopeless?     Trouble falling or staying asleep, or sleeping too much?     Feeling tired or having little energy?     Poor appetite or overeating?     Feeling bad about yourself - or that you are a failure or have let yourself or your family down?     Trouble concentrating on things, such as reading the newspaper or watching television?     Moving or speaking so slowly that other people could have noticed? Or the opposite - being so fidgety or restless that you have been moving around a lot more than usual?     Thoughts that you would be better off dead, or of hurting yourself in some way?     PHQ-9 Total Score     If you checked  off any problems, how difficult have these problems made it for you to do your work, take care of things at home, or get along with other people?          KPS: 100%     Results Review:   The following data was reviewed by: RONY Ko on 06/20/2022:  PSA    PSA 12/9/21 5/24/22   PSA 6.3 (A) 2.480   (A) Abnormal value       Comments are available for some flowsheets but are not being displayed.           Imaging:   No radiology results for the last 90 days.     CT Abdomen Pelvis With & Without Contrast     Result Date: 2/18/2022  1.  Mild pelvocaliectasis of the left kidney that may represent parapelvic cystic formation or sequelae of chronic UPJ stenosis. 2.  Heterogeneous mild enlargement of the prostate gland measuring up to 4.6 cm. 3.  Degenerative changes lumbar spine as described.  This report was finalized on 2/18/2022 3:02 PM by Dr. Garry Loomis MD.       NM Bone Scan Whole Body     Result Date: 2/15/2022  No evidence of bony metastatic disease.  This report was finalized on 2/15/2022 12:01 PM by Dr. Garry Loomis MD.      Pathology:  1/28/2022         Objective     Physical Exam:  Physical Exam  Vitals reviewed.   Constitutional:       Appearance: Normal appearance.   Cardiovascular:      Rate and Rhythm: Normal rate and regular rhythm.      Pulses: Normal pulses.      Heart sounds: Normal heart sounds.   Pulmonary:      Effort: Pulmonary effort is normal.      Breath sounds: Normal breath sounds.   Skin:     General: Skin is warm and dry.   Neurological:      General: No focal deficit present.      Mental Status: He is alert and oriented to person, place, and time. Mental status is at baseline.   Psychiatric:         Mood and Affect: Mood normal.         Behavior: Behavior normal.         Thought Content: Thought content normal.         Vital Signs:   Vitals:    06/20/22 1130   BP: 175/79   Pulse: 61   Resp: 18   Temp: 97.9 °F (36.6 °C)   TempSrc: Temporal   SpO2: 96%   Weight: 79.8 kg (176  lb)   PainSc: 0-No pain     Body mass index is 25.98 kg/m².       Assessment / Plan      Assessment/Plan:   Mr. Chatterjee is a 72 year old M with intermediate/low risk prostate cancer with PSA 6.3, G6 in right apex and left base, G7 (3+4) in left apex and left mid, (50% with perineural invasion). CT and Bone scan are negative.     Patient is here today around a little over a month since his last radiation treatment.  He started his treatments on 4/4/2022 and completed them on 5/11/2022.  He received a total of 7000 cGy in 28 fractions to the prostate and seminal vesicles.  At his last OTV he had no issues, and had no issues throughout treatment.  The patient recently saw Dr. Mccormick on 5/24/2022 and a PSA was drawn.  PSA resulted as 2.48.  The patient is not on any HRT, he will follow-up with Dr. Mccormick in 6 months.    Today the patient is doing great.  He states that he has not experienced any issues since treatment ended.  He has no complaints of dysuria, frequency, nocturia, difficulty urinating, fatigue, diarrhea, or any skin changes.  He was concerned that his recent PSA was 2.48.  I reassured him that this is okay and to be expected.  He had just finished radiation at the time of this redraw.  I placed a follow-up PSA to be drawn at the 3-month ervin status post XRT to get his baseline after completion.  We also discussed what to expect as far as upcoming PSA draws and how often he will be getting these.  We talked about his upcoming appointments and what to expect.  The patient is in agreement with everything, and understands what to expect in the future.  We reviewed his survivorship packet today and I answered all questions to the patient's satisfaction.  He will continue to follow-up with Dr. Mccormick.  We will see him back again in 6 months, sooner if needed. Instructed the patient that if he has any questions or any new symptoms arise do not hesitate to call us.    Follow Up:   PSA to be drawn on 8/18/2022,  will notify patient of results  Return in about 6 months (around 12/20/2022) for Office Visit.    RONY Ko  06/20/22 11:40 EDT

## 2022-08-17 ENCOUNTER — LAB (OUTPATIENT)
Dept: ONCOLOGY | Facility: HOSPITAL | Age: 72
End: 2022-08-17

## 2022-08-17 DIAGNOSIS — C61 PROSTATE CANCER: ICD-10-CM

## 2022-08-17 LAB — PSA SERPL-MCNC: 0.86 NG/ML (ref 0–4)

## 2022-08-17 PROCEDURE — 36415 COLL VENOUS BLD VENIPUNCTURE: CPT

## 2022-08-17 PROCEDURE — 84153 ASSAY OF PSA TOTAL: CPT

## 2022-11-29 ENCOUNTER — OFFICE VISIT (OUTPATIENT)
Dept: UROLOGY | Facility: CLINIC | Age: 72
End: 2022-11-29

## 2022-11-29 VITALS — WEIGHT: 173 LBS | BODY MASS INDEX: 25.62 KG/M2 | HEIGHT: 69 IN

## 2022-11-29 DIAGNOSIS — C61 PROSTATE CANCER: Primary | ICD-10-CM

## 2022-11-29 DIAGNOSIS — N40.0 BENIGN PROSTATIC HYPERPLASIA, UNSPECIFIED WHETHER LOWER URINARY TRACT SYMPTOMS PRESENT: ICD-10-CM

## 2022-11-29 PROCEDURE — 99213 OFFICE O/P EST LOW 20 MIN: CPT | Performed by: UROLOGY

## 2022-11-29 RX ORDER — TADALAFIL 20 MG/1
20 TABLET ORAL AS NEEDED
Qty: 20 TABLET | Refills: 1 | Status: SHIPPED | OUTPATIENT
Start: 2022-11-29

## 2022-11-29 RX ORDER — TADALAFIL 5 MG/1
5 TABLET ORAL DAILY PRN
Qty: 30 TABLET | Refills: 6 | Status: SHIPPED | OUTPATIENT
Start: 2022-11-29

## 2022-12-04 PROBLEM — N40.0 BENIGN PROSTATIC HYPERPLASIA WITHOUT LOWER URINARY TRACT SYMPTOMS: Status: RESOLVED | Noted: 2017-12-01 | Resolved: 2022-12-04

## 2022-12-21 ENCOUNTER — APPOINTMENT (OUTPATIENT)
Dept: RADIATION ONCOLOGY | Facility: HOSPITAL | Age: 72
End: 2022-12-21
Payer: MEDICARE

## 2022-12-21 ENCOUNTER — OFFICE VISIT (OUTPATIENT)
Dept: RADIATION ONCOLOGY | Facility: HOSPITAL | Age: 72
End: 2022-12-21
Payer: MEDICARE

## 2022-12-21 VITALS
TEMPERATURE: 97.9 F | OXYGEN SATURATION: 98 % | SYSTOLIC BLOOD PRESSURE: 184 MMHG | HEART RATE: 50 BPM | DIASTOLIC BLOOD PRESSURE: 83 MMHG | WEIGHT: 177.6 LBS | BODY MASS INDEX: 26.21 KG/M2 | RESPIRATION RATE: 18 BRPM

## 2022-12-21 DIAGNOSIS — C61 PROSTATE CANCER: Primary | ICD-10-CM

## 2022-12-21 DIAGNOSIS — C61 PROSTATE CANCER: ICD-10-CM

## 2022-12-21 LAB — PSA SERPL-MCNC: 0.45 NG/ML (ref 0–4)

## 2022-12-21 PROCEDURE — 99213 OFFICE O/P EST LOW 20 MIN: CPT

## 2022-12-21 PROCEDURE — G0463 HOSPITAL OUTPT CLINIC VISIT: HCPCS

## 2022-12-21 PROCEDURE — 84153 ASSAY OF PSA TOTAL: CPT

## 2022-12-21 NOTE — PROGRESS NOTES
Office Follow Up Note      Patient Name: Cristopher Chatterjee  : 1950   MRN: [id number]     Requesting Physician: Jayson Mccormick,*    Chief Complaint:  Prostate Cancer   Staging: T2 NX MX    History of Present Illness: Cristopher Chatterjee is a pleasant 72 y.o. male who is here today for follow up after completing radiation therapy.     He has been seeing Dr. Mccormick for 2 years, initially for BPH.  He was checking his PSA regularly.  Recently had an increase from 4.7 to 6.3.  Dr. Mccormick performed a biopsy on 2022.  Pathology revealed Galion 3+3 = 6 to the right apex and left base, and a [place] 3+4 = 7 to the left apex in the left mid.  A bone scan was performed on 2/15/2022 which was negative.  And a CT of the abdomen pelvis was previously done on 2022 and this was negative as well.      Interval History:  Patient is here today around 7 months since his last radiation treatment.  He started his treatments on 2022 and completed them on 2022.  He received a total of 7000 cGy in 28 fractions to the prostate and seminal vesicles.  At his last OTV he had no issues, and had no issues throughout treatment.  He continues to do great post treatment and he has no complaints of any lingering symptoms from XRT.    Subjective      Review of Systems:  Review of Systems   Constitutional: Negative.  Negative for fatigue.   HENT: Negative.    Eyes: Negative.    Respiratory: Negative.    Cardiovascular: Negative.    Gastrointestinal: Negative.  Negative for blood in stool and diarrhea.   Endocrine: Negative.    Genitourinary: Negative.  Negative for decreased urine volume, difficulty urinating, dysuria, frequency, hematuria, nocturia and urinary incontinence.   Musculoskeletal: Negative.    Skin: Negative for color change and dry skin.   Neurological: Negative.    Hematological: Negative.    Psychiatric/Behavioral: Negative.    All other systems reviewed and are negative.    I have reviewed and confirmed  the accuracy of the ROS as documented by the MA/LPN/RN RONY Ko     The following portions of the patient's history were reviewed and updated as appropriate: allergies, current medications, past family history, past medical history, past social history, past surgical history and problem list.    Past Oncology History:   Oncology/Hematology History   Prostate cancer (HCC)   2/7/2022 Initial Diagnosis    Prostate cancer (HCC)     4/4/2022 - 5/11/2022 Radiation    Radiation OncologyTreatment Course:  Cristohper Chatterjee received 7000 cGy in 28 fractions to prostate via External Beam Radiation - EBRT.          PHQ-9 Depression Screening  Little interest or pleasure in doing things?     Feeling down, depressed, or hopeless?     Trouble falling or staying asleep, or sleeping too much?     Feeling tired or having little energy?     Poor appetite or overeating?     Feeling bad about yourself - or that you are a failure or have let yourself or your family down?     Trouble concentrating on things, such as reading the newspaper or watching television?     Moving or speaking so slowly that other people could have noticed? Or the opposite - being so fidgety or restless that you have been moving around a lot more than usual?     Thoughts that you would be better off dead, or of hurting yourself in some way?     PHQ-9 Total Score     If you checked off any problems, how difficult have these problems made it for you to do your work, take care of things at home, or get along with other people?          KPS: 100%     Results Review:   The following data was reviewed by: RONY Ko on 06/20/2022:  PSA    PSA 5/24/22 8/17/22   PSA 2.480 0.863           Imaging:   No radiology results for the last 90 days.     CT Abdomen Pelvis With & Without Contrast     Result Date: 2/18/2022  1.  Mild pelvocaliectasis of the left kidney that may represent parapelvic cystic formation or sequelae of chronic UPJ stenosis. 2.   Heterogeneous mild enlargement of the prostate gland measuring up to 4.6 cm. 3.  Degenerative changes lumbar spine as described.  This report was finalized on 2/18/2022 3:02 PM by Dr. Garry Loomis MD.       NM Bone Scan Whole Body     Result Date: 2/15/2022  No evidence of bony metastatic disease.  This report was finalized on 2/15/2022 12:01 PM by Dr. Garry Loomis MD.      Pathology:  1/28/2022         Objective     Physical Exam:  Physical Exam  Vitals reviewed.   Constitutional:       Appearance: Normal appearance.   Cardiovascular:      Pulses: Normal pulses.   Pulmonary:      Effort: Pulmonary effort is normal. No respiratory distress.   Skin:     General: Skin is warm and dry.   Neurological:      General: No focal deficit present.      Mental Status: He is alert and oriented to person, place, and time. Mental status is at baseline.   Psychiatric:         Mood and Affect: Mood normal.         Behavior: Behavior normal.         Thought Content: Thought content normal.       Vital Signs:   Vitals:    12/21/22 1055   BP: (!) 184/83   Pulse: 50   Resp: 18   Temp: 97.9 °F (36.6 °C)   TempSrc: Temporal   SpO2: 98%   Weight: 80.6 kg (177 lb 9.6 oz)   PainSc: 0-No pain     Body mass index is 26.21 kg/m².     Assessment / Plan      Assessment/Plan:   Mr. Chatterjee is a 72 year old M with intermediate/low risk prostate cancer with PSA 6.3, G6 in right apex and left base, G7 (3+4) in left apex and left mid, (50% with perineural invasion). CT and Bone scan are negative.     Patient is here today around 7 months since his last radiation treatment.  He started his treatments on 4/4/2022 and completed them on 5/11/2022.  He received a total of 7000 cGy in 28 fractions to the prostate and seminal vesicles.  At his last OTV he had no issues, and had no issues throughout treatment.  The patient recently saw Dr. Mccormick on 11/29/2022.  The patient is not on any HRT.    Today the patient continues to do great.  He continues to be  free from any symptoms related to XRT.  He has no complaints of dysuria, frequency, nocturia, difficulty urinating, fatigue, diarrhea, or any skin changes. We talked about his upcoming appointments, lab work, and what to expect moving forward.  The patient is in agreement with everything, and understands what to expect in the future.  He will continue to follow-up with Dr. Mccormick.  A new PSA was drawn today, we will notify the patient of his results.  His most recent PSA on 8/17/2022 was 0.863.  We will see him back again in 6 months, sooner if needed. Instructed the patient that if he has any questions or any new symptoms arise do not hesitate to call us.    Follow Up:   Return in about 6 months (around 6/21/2023) for Office Visit.    Nazanin Flores, RONY  12/21/22 11:26 EST

## 2023-12-05 ENCOUNTER — OFFICE VISIT (OUTPATIENT)
Dept: UROLOGY | Facility: CLINIC | Age: 73
End: 2023-12-05
Payer: MEDICARE

## 2023-12-05 VITALS
HEIGHT: 69 IN | SYSTOLIC BLOOD PRESSURE: 162 MMHG | BODY MASS INDEX: 26.25 KG/M2 | HEART RATE: 53 BPM | WEIGHT: 177.2 LBS | DIASTOLIC BLOOD PRESSURE: 69 MMHG

## 2023-12-05 DIAGNOSIS — C61 PROSTATE CANCER: Primary | ICD-10-CM

## 2023-12-05 LAB — PSA SERPL-MCNC: 0.21 NG/ML (ref 0–4)

## 2023-12-05 PROCEDURE — 36415 COLL VENOUS BLD VENIPUNCTURE: CPT | Performed by: UROLOGY

## 2023-12-05 PROCEDURE — 1160F RVW MEDS BY RX/DR IN RCRD: CPT | Performed by: UROLOGY

## 2023-12-05 PROCEDURE — 99213 OFFICE O/P EST LOW 20 MIN: CPT | Performed by: UROLOGY

## 2023-12-05 PROCEDURE — 1159F MED LIST DOCD IN RCRD: CPT | Performed by: UROLOGY

## 2023-12-05 PROCEDURE — 84153 ASSAY OF PSA TOTAL: CPT | Performed by: UROLOGY

## 2023-12-05 NOTE — PROGRESS NOTES
Chief Complaint:      Chief Complaint   Patient presents with    Prostate Cancer     6 month follow up       HPI:   73 y.o. male he returns today he has known low stage low-grade prostate cancer completing a course of external beam radiotherapy he reports no voiding symptomatology no frequency.  He reports no lower urinary tract symptomatology, particularly irritative symptoms such as frequency, urgency, dysuria, and obstructive symptomatology, particularly dribbling, hesitancy, and intermittency.  No rectal irritation PSA is currently pending overall I will see him back in 6 months.    Past Medical History:     Past Medical History:   Diagnosis Date    Benign prostatic hyperplasia without lower urinary tract symptoms     Elevated PSA     Erectile dysfunction     Hypertension     Kidney stone     Prostate cancer 2/7/2022       Current Meds:     Current Outpatient Medications   Medication Sig Dispense Refill    allopurinol (ZYLOPRIM) 300 MG tablet Take 1 tablet by mouth Daily.      cetirizine (zyrTEC) 10 MG tablet Take 1 tablet by mouth Daily.      finasteride (PROSCAR) 5 MG tablet Take 1 tablet by mouth Daily. 90 tablet 3    lisinopril (PRINIVIL,ZESTRIL) 20 MG tablet Take 1 tablet by mouth Daily.      montelukast (SINGULAIR) 10 MG tablet Take 1 tablet by mouth Every Night.      pantoprazole (PROTONIX) 40 MG EC tablet Take 1 tablet by mouth 2 (Two) Times a Day.      tadalafil (Cialis) 20 MG tablet Take 1 tablet by mouth As Needed for Erectile Dysfunction. 20 tablet 1    tadalafil (Cialis) 5 MG tablet Take 1 tablet by mouth Daily As Needed for Erectile Dysfunction. Take one Daily 30 tablet 6     No current facility-administered medications for this visit.        Allergies:      No Known Allergies     Past Surgical History:     Past Surgical History:   Procedure Laterality Date    KIDNEY STONE SURGERY      PARTIAL KNEE ARTHROPLASTY Right        Social History:     Social History     Socioeconomic History    Marital  status:    Tobacco Use    Smoking status: Never    Smokeless tobacco: Never   Vaping Use    Vaping Use: Never used   Substance and Sexual Activity    Alcohol use: Yes     Alcohol/week: 0.0 standard drinks of alcohol     Comment: Occasionally beer    Drug use: No    Sexual activity: Yes     Partners: Female     Birth control/protection: None       Family History:     Family History   Problem Relation Age of Onset    No Known Problems Father     Heart disease Mother        Review of Systems:     Review of Systems   Constitutional: Negative.    HENT: Negative.     Eyes: Negative.    Respiratory: Negative.     Cardiovascular: Negative.    Gastrointestinal: Negative.    Endocrine: Negative.    Musculoskeletal: Negative.    Allergic/Immunologic: Negative.    Neurological: Negative.    Hematological: Negative.    Psychiatric/Behavioral: Negative.         Physical Exam:     Physical Exam  Vitals and nursing note reviewed.   Constitutional:       Appearance: He is well-developed.   HENT:      Head: Normocephalic and atraumatic.   Eyes:      Conjunctiva/sclera: Conjunctivae normal.      Pupils: Pupils are equal, round, and reactive to light.   Cardiovascular:      Rate and Rhythm: Normal rate and regular rhythm.      Heart sounds: Normal heart sounds.   Pulmonary:      Effort: Pulmonary effort is normal.      Breath sounds: Normal breath sounds.   Abdominal:      General: Bowel sounds are normal.      Palpations: Abdomen is soft.   Musculoskeletal:         General: Normal range of motion.      Cervical back: Normal range of motion.   Skin:     General: Skin is warm and dry.   Neurological:      Mental Status: He is alert and oriented to person, place, and time.      Deep Tendon Reflexes: Reflexes are normal and symmetric.   Psychiatric:         Behavior: Behavior normal.         Thought Content: Thought content normal.         Judgment: Judgment normal.         I have reviewed the following portions of the patient's  history: Allergies, current medications, past family history, past medical history, past social history, past surgical history, problem list, and ROS and confirm it is accurate.    Recent Image (CT and/or KUB):      CT Abdomen and Pelvis: Results for orders placed during the hospital encounter of 02/18/22    CT Abdomen Pelvis With & Without Contrast    Narrative  EXAM:  CT Abdomen and Pelvis Without and With Intravenous Contrast    EXAM DATE:  2/18/2022 7:53 AM    CLINICAL HISTORY:  Prostate cancer, initial staging, low risk; [id number] neoplasm of  prostate    TECHNIQUE:  Axial computed tomography images of the abdomen and pelvis without and  with intravenous contrast.  Sagittal and coronal reformatted images were  created and reviewed.  This CT exam was performed using one or more of  the following dose reduction techniques:  automated exposure control,  adjustment of the mA and/or kV according to patient size, and/or use of  iterative reconstruction technique.    COMPARISON:  No relevant prior studies available.    FINDINGS:  LUNG BASES:  Unremarkable.  No mass.  No consolidation.    ABDOMEN:  LIVER:  Unremarkable.  No mass.  GALLBLADDER AND BILE DUCTS:  Unremarkable.  No calcified stones.  No  ductal dilation.  PANCREAS:  Unremarkable.  No mass.  No ductal dilation.  SPLEEN:  Unremarkable.  No splenomegaly.  ADRENALS:  Unremarkable.  No mass.  KIDNEYS AND URETERS:  Mild pelvocaliectasis of the left kidney that  may represent parapelvic cystic formation or sequelae of chronic UPJ  stenosis.  No obstructing stones.  No hydronephrosis.  STOMACH AND BOWEL:  Unremarkable.  No obstruction.  No mucosal  thickening.    PELVIS:  APPENDIX:  No findings to suggest acute appendicitis.  BLADDER:  Unremarkable.  No mass.  No stones.  REPRODUCTIVE:  Heterogeneous mild enlargement of the prostate gland  measuring up to 4.6 cm.    ABDOMEN and PELVIS:  INTRAPERITONEAL SPACE:  Unremarkable.  No free air.  No significant  fluid  collection.  BONES/JOINTS:  Degenerative disc disease throughout the lumbar spine.  Degenerative facet arthropathy throughout the lumbar spine, most  prominent in the lower lumbar spine.  No acute fracture.  No  dislocation.  SOFT TISSUES:  Unremarkable.  VASCULATURE:  Unremarkable.  No abdominal aortic aneurysm.  LYMPH NODES:  Unremarkable.  No enlarged lymph nodes.    Impression  1.  Mild pelvocaliectasis of the left kidney that may represent  parapelvic cystic formation or sequelae of chronic UPJ stenosis.  2.  Heterogeneous mild enlargement of the prostate gland measuring up to  4.6 cm.  3.  Degenerative changes lumbar spine as described.    This report was finalized on 2/18/2022 3:02 PM by Dr. Garry Loomis MD.       CT Stone Protocol: No results found for this or any previous visit.       KUB: No results found for this or any previous visit.       Labs (past 3 months):      No visits with results within 3 Month(s) from this visit.   Latest known visit with results is:   Office Visit on 06/21/2023   Component Date Value Ref Range Status    PSA 06/21/2023 0.238  0.000 - 4.000 ng/mL Final        Procedure:       Assessment/Plan:   Prostate cancer:  He returns today status post biopsy for extensive discussion of prostate cancer.  We discussed staging and grading of the disease.  I described with a Mikado system being from a 2 to10 scale with the most common low-grade pattern being a Ruthann 6.  I discussed the staging workup including a total body bone scan and CT scan especially with a PSA greater than 10.  We discussed the various options at length. I discussed a radical retropubic prostatectomy done in the traditional fashion and using the robotic technique.  I then discussed radiation treatment both seed therapy and external beam therapy using Gold fiduciary markers.  We talked about some of the other alternatives such as a cryosurgical ablation at high intensity focused ultrasound as being viable  alternatives but not recommended at this time.  He focused on aggressive watchful waiting and explained that currently low literature it's been discovered that a lot of men can actually observe it especially with numerous other comorbidities cannot have problems from the cancer by itself.  Talked about hormonal ablation and the side effects of creation of insulin resistance.  Overall, the patient was given appropriate literature, is going to think about it, and I'm going to revisit the topic with them after the next office visit.  Status post uncomplicated radiotherapy with a good postop result and a PSA currently pending      This document has been electronically signed by JOSE BARTH MD December 5, 2023 08:35 EST    Dictated Utilizing Dragon Dictation: Part of this note may be an electronic transcription/translation of spoken language to printed text using the Dragon Dictation System.

## 2024-03-29 ENCOUNTER — OFFICE VISIT (OUTPATIENT)
Dept: RADIATION ONCOLOGY | Facility: HOSPITAL | Age: 74
End: 2024-03-29
Payer: MEDICARE

## 2024-03-29 VITALS
OXYGEN SATURATION: 96 % | HEART RATE: 58 BPM | RESPIRATION RATE: 18 BRPM | TEMPERATURE: 97.7 F | SYSTOLIC BLOOD PRESSURE: 165 MMHG | DIASTOLIC BLOOD PRESSURE: 90 MMHG

## 2024-03-29 DIAGNOSIS — C61 PROSTATE CANCER: Primary | ICD-10-CM

## 2024-03-29 PROCEDURE — G0463 HOSPITAL OUTPT CLINIC VISIT: HCPCS | Performed by: RADIOLOGY

## 2024-03-29 NOTE — PROGRESS NOTES
Established Patient Visit      Patient: Cristopher Chatterjee   YOB: 1950   Medical Record Number: [id number]   Date of Visit: March 29, 2024   Primary Diagnosis:  Cancer Staging   No matching staging information was found for the patient.   Prostate cancer [C61]  ICD 10 Code:                                             History of Present Illness: Mr. Chatterjee is a 73-year-old gentleman who returns as an established patient for a follow-up visit.  He is known to have a stage IIb (cT2 cN0c M0) grade group 2, Ruthann's 7 (3+4) carcinoma the prostate.  His pretreatment PSA was 6.3.  He received a definitive course of radiation therapy which concluded on 5/11/2022.    Today Mr. John states he is doing quite well.  He is having no hematuria dysuria or nocturia.  He is having no bowel problems.  His sexual activity is somewhat diminished.  He has no new areas of skeletal tenderness.    Is pretreatment PSA was 6.3.  Since completing treatment it continues to go down.  On 12/21/2022 it was 0.448 and on 6/1/2023 it went down to 0.238.  His most recent PSA of 12/5/2023 shows further reduction down to 0.213.    (Old medical records were requested, reviewed, and summarized in the HPI)    Review of Systems       All other systems reviewed and are negative.        Past Medical History:   Diagnosis Date    Benign prostatic hyperplasia without lower urinary tract symptoms     Elevated PSA     Erectile dysfunction     Hypertension     Kidney stone     Prostate cancer 2/7/2022        Past Surgical History:   Procedure Laterality Date    KIDNEY STONE SURGERY      PARTIAL KNEE ARTHROPLASTY Right       Family History   Problem Relation Age of Onset    No Known Problems Father     Heart disease Mother         Social History     Socioeconomic History    Marital status:    Tobacco Use    Smoking status: Never    Smokeless tobacco: Never   Vaping Use    Vaping status: Never Used   Substance and Sexual Activity    Alcohol use: Yes      Alcohol/week: 0.0 standard drinks of alcohol     Comment: Occasionally beer    Drug use: No    Sexual activity: Yes     Partners: Female     Birth control/protection: None         Allergies:  Patient has no known allergies.   Prior to Admission medications    Medication Sig Start Date End Date Taking? Authorizing Provider   allopurinol (ZYLOPRIM) 300 MG tablet Take 1 tablet by mouth Daily. 10/23/17  Yes Nato Fritz MD   cetirizine (zyrTEC) 10 MG tablet Take 1 tablet by mouth Daily.   Yes Nato Fritz MD   finasteride (PROSCAR) 5 MG tablet Take 1 tablet by mouth Daily. 4/24/22  Yes Jayson Mccormick MD   lisinopril (PRINIVIL,ZESTRIL) 20 MG tablet Take 1 tablet by mouth Daily. 6/20/23  Yes Nato Fritz MD   montelukast (SINGULAIR) 10 MG tablet Take 1 tablet by mouth Every Night.   Yes Nato Fritz MD   pantoprazole (PROTONIX) 40 MG EC tablet Take 1 tablet by mouth 2 (Two) Times a Day. 12/12/21  Yes Nato Fritz MD   tadalafil (Cialis) 20 MG tablet Take 1 tablet by mouth As Needed for Erectile Dysfunction. 5/30/23  Yes Jayson Mccormick MD   tadalafil (Cialis) 5 MG tablet Take 1 tablet by mouth Daily As Needed for Erectile Dysfunction. Take one Daily 5/30/23  Yes Jayson Mccormick MD      Pain:(on a scale of 0-10)    Pain Score    03/29/24 1019   PainSc: 0-No pain        Cristopher Chatterjee reports a pain score of 0.  Given his pain assessment as noted, treatment options were discussed and the following options were decided upon as a follow-up plan to address the patient's pain: continuation of current treatment plan for pain.       Quality of Life:   KPS: 100 - Full Activity  ECOG: (0) Fully active, able to carry on all predisease performance without restriction        Physical Examination:  Vitals:  VITALS@    Height:    Weight:   There is no height or weight on file to calculate BMI.    Physical Exam  Constitutional: The patient is a well-developed,  "well-nourished 73-year-old male in no acute distress.  Alert and oriented ×3.  HEENT: Atraumatic. Normocephalic. No abnormalities noted.  Lymphatics: No cervical, supraclavicular, or axillary, or inguinal lymphadenopathy is palpated.  CV: Regular rate and rhythm.  No murmurs, rubs, or gallops are appreciated.  Respiratory: Lungs clear to auscultation.  Breath sounds equal bilaterally.  GI: Abdomen soft, nontender, nondistended, with no hepatosplenomegaly or masses palpated.  Rectal: Digital rectal exam reveals normal rectal sphincter tone. The prostate  is smooth, nontender, with no suspicious nodules palpated. Pelvic: External genitalia are within normal limits.  Speculum exam reveals normal vaginal mucosa.  The cervix is visualized, with no suspicious lesions noted.  Bimanual exam reveals no suspicious adnexal masses.  Extremities: No clubbing, cyanosis, or edema.  The patient has had his right knee replaced.  He has osteoarthritic changes in the right hip.  Neurologic: Cranial nerves II through XII are grossly intact, with no focal neurological deficits noted on exam.  Psychiatric: Alert and oriented x3. Normal affect, with no anxiety or depression noted.    Radiographs : No new x-ray  Pathology: No new pathology  Labs:   Lab Results   Component Value Date    CREATININE 1.00 02/18/2022    No results found for: \"WBC\", \"HGB\", \"HCT\", \"PLT\"   PSA   Date Value Ref Range Status   12/05/2023 0.213 0.000 - 4.000 ng/mL Final   06/21/2023 0.238 0.000 - 4.000 ng/mL Final   12/21/2022 0.448 0.000 - 4.000 ng/mL Final   08/17/2022 0.863 0.000 - 4.000 ng/mL Final   05/24/2022 2.480 0.000 - 4.000 ng/mL Final   12/09/2021 6.3 (H) 0.0 - 4.0 ng/mL Final     Comment:     Roche ECLIA methodology.  According to the American Urological Association, Serum PSA should  decrease and remain at undetectable levels after radical  prostatectomy. The AUA defines biochemical recurrence as an initial  PSA value 0.2 ng/mL or greater followed by a " "subsequent confirmatory  PSA value 0.2 ng/mL or greater.  Values obtained with different assay methods or kits cannot be used  interchangeably. Results cannot be interpreted as absolute evidence  of the presence or absence of malignant disease.   06/03/2021 4.7 (H) 0.0 - 4.0 ng/mL Final     Comment:     Roche ECLIA methodology.  According to the American Urological Association, Serum PSA should  decrease and remain at undetectable levels after radical  prostatectomy. The AUA defines biochemical recurrence as an initial  PSA value 0.2 ng/mL or greater followed by a subsequent confirmatory  PSA value 0.2 ng/mL or greater.  Values obtained with different assay methods or kits cannot be used  interchangeably. Results cannot be interpreted as absolute evidence  of the presence or absence of malignant disease.   12/10/2020 4.410 (H) 0.000 - 4.000 ng/mL Final   12/20/2018 2.980 0.000 - 4.000 ng/mL Final   11/30/2017 2.370 0.000 - 4.000 ng/mL Final    No results found for: \"CEA\"           ASSESSMENT/PLAN: Clinical stage IIb (cT2 cN0c M0) Maxwell 7, PSA 6.3 carcinoma of the prostate status post definitive radiation therapy.  At this time he is doing very well.  There is no clinical radiographic or biochemical evidence of recurrence.  I have asked the patient to return to our department in 1 year for his next follow-up visit.    Sincerely,      Electronically signed by Mitch Huitron MD 3/29/2024  11:01 EDT    "

## 2024-06-06 ENCOUNTER — OFFICE VISIT (OUTPATIENT)
Dept: UROLOGY | Facility: CLINIC | Age: 74
End: 2024-06-06
Payer: MEDICARE

## 2024-06-06 VITALS — HEIGHT: 69 IN | BODY MASS INDEX: 25.62 KG/M2 | WEIGHT: 173 LBS

## 2024-06-06 DIAGNOSIS — C61 PROSTATE CANCER: Primary | ICD-10-CM

## 2024-06-06 LAB — PSA SERPL-MCNC: 0.17 NG/ML (ref 0–4)

## 2024-06-06 PROCEDURE — 99213 OFFICE O/P EST LOW 20 MIN: CPT | Performed by: UROLOGY

## 2024-06-06 PROCEDURE — 84153 ASSAY OF PSA TOTAL: CPT | Performed by: UROLOGY

## 2024-06-06 PROCEDURE — 1160F RVW MEDS BY RX/DR IN RCRD: CPT | Performed by: UROLOGY

## 2024-06-06 PROCEDURE — 1159F MED LIST DOCD IN RCRD: CPT | Performed by: UROLOGY

## 2024-06-06 NOTE — PROGRESS NOTES
Chief Complaint:      Chief Complaint   Patient presents with    Prostate Cancer       HPI:   74 y.o. male returns today who is status post radiation treatment doing well he last saw oncology in March.  He reports no lower urinary tract symptomatology, particularly irritative symptoms such as frequency, urgency, dysuria, and obstructive symptomatology, particularly dribbling, hesitancy, and intermittency.  He is on combination therapy with Cialis both 5 and 20 mg.  PSA is pending I will see him back in 6 months he is otherwise asymptomatic    Past Medical History:     Past Medical History:   Diagnosis Date    Benign prostatic hyperplasia without lower urinary tract symptoms     Elevated PSA     Erectile dysfunction     Hypertension     Kidney stone     Prostate cancer 2/7/2022       Current Meds:     Current Outpatient Medications   Medication Sig Dispense Refill    allopurinol (ZYLOPRIM) 300 MG tablet Take 1 tablet by mouth Daily.      cetirizine (zyrTEC) 10 MG tablet Take 1 tablet by mouth Daily.      finasteride (PROSCAR) 5 MG tablet Take 1 tablet by mouth Daily. 90 tablet 3    lisinopril (PRINIVIL,ZESTRIL) 20 MG tablet Take 1 tablet by mouth Daily.      montelukast (SINGULAIR) 10 MG tablet Take 1 tablet by mouth Every Night.      pantoprazole (PROTONIX) 40 MG EC tablet Take 1 tablet by mouth 2 (Two) Times a Day.      tadalafil (Cialis) 20 MG tablet Take 1 tablet by mouth As Needed for Erectile Dysfunction. 20 tablet 1    tadalafil (Cialis) 5 MG tablet Take 1 tablet by mouth Daily As Needed for Erectile Dysfunction. Take one Daily 30 tablet 6     No current facility-administered medications for this visit.        Allergies:      No Known Allergies     Past Surgical History:     Past Surgical History:   Procedure Laterality Date    KIDNEY STONE SURGERY      PARTIAL KNEE ARTHROPLASTY Right        Social History:     Social History     Socioeconomic History    Marital status:    Tobacco Use    Smoking status:  Never    Smokeless tobacco: Never   Vaping Use    Vaping status: Never Used   Substance and Sexual Activity    Alcohol use: Yes     Alcohol/week: 0.0 standard drinks of alcohol     Comment: Occasionally beer    Drug use: No    Sexual activity: Yes     Partners: Female     Birth control/protection: None       Family History:     Family History   Problem Relation Age of Onset    No Known Problems Father     Heart disease Mother        Review of Systems:     Review of Systems   Constitutional: Negative.    HENT: Negative.     Eyes: Negative.    Respiratory: Negative.     Cardiovascular: Negative.    Gastrointestinal: Negative.    Endocrine: Negative.    Musculoskeletal: Negative.    Allergic/Immunologic: Negative.    Neurological: Negative.    Hematological: Negative.    Psychiatric/Behavioral: Negative.         Physical Exam:     Physical Exam  Vitals and nursing note reviewed.   Constitutional:       Appearance: He is well-developed.   HENT:      Head: Normocephalic and atraumatic.   Eyes:      Conjunctiva/sclera: Conjunctivae normal.      Pupils: Pupils are equal, round, and reactive to light.   Cardiovascular:      Rate and Rhythm: Normal rate and regular rhythm.      Heart sounds: Normal heart sounds.   Pulmonary:      Effort: Pulmonary effort is normal.      Breath sounds: Normal breath sounds.   Abdominal:      General: Bowel sounds are normal.      Palpations: Abdomen is soft.   Musculoskeletal:         General: Normal range of motion.      Cervical back: Normal range of motion.   Skin:     General: Skin is warm and dry.   Neurological:      Mental Status: He is alert and oriented to person, place, and time.      Deep Tendon Reflexes: Reflexes are normal and symmetric.   Psychiatric:         Behavior: Behavior normal.         Thought Content: Thought content normal.         Judgment: Judgment normal.         I have reviewed the following portions of the patient's history: Allergies, current medications, past  family history, past medical history, past social history, past surgical history, problem list, and ROS and confirm it is accurate.    Recent Image (CT and/or KUB):      CT Abdomen and Pelvis: Results for orders placed during the hospital encounter of 02/18/22    CT Abdomen Pelvis With & Without Contrast    Narrative  EXAM:  CT Abdomen and Pelvis Without and With Intravenous Contrast    EXAM DATE:  2/18/2022 7:53 AM    CLINICAL HISTORY:  Prostate cancer, initial staging, low risk; [id number] neoplasm of  prostate    TECHNIQUE:  Axial computed tomography images of the abdomen and pelvis without and  with intravenous contrast.  Sagittal and coronal reformatted images were  created and reviewed.  This CT exam was performed using one or more of  the following dose reduction techniques:  automated exposure control,  adjustment of the mA and/or kV according to patient size, and/or use of  iterative reconstruction technique.    COMPARISON:  No relevant prior studies available.    FINDINGS:  LUNG BASES:  Unremarkable.  No mass.  No consolidation.    ABDOMEN:  LIVER:  Unremarkable.  No mass.  GALLBLADDER AND BILE DUCTS:  Unremarkable.  No calcified stones.  No  ductal dilation.  PANCREAS:  Unremarkable.  No mass.  No ductal dilation.  SPLEEN:  Unremarkable.  No splenomegaly.  ADRENALS:  Unremarkable.  No mass.  KIDNEYS AND URETERS:  Mild pelvocaliectasis of the left kidney that  may represent parapelvic cystic formation or sequelae of chronic UPJ  stenosis.  No obstructing stones.  No hydronephrosis.  STOMACH AND BOWEL:  Unremarkable.  No obstruction.  No mucosal  thickening.    PELVIS:  APPENDIX:  No findings to suggest acute appendicitis.  BLADDER:  Unremarkable.  No mass.  No stones.  REPRODUCTIVE:  Heterogeneous mild enlargement of the prostate gland  measuring up to 4.6 cm.    ABDOMEN and PELVIS:  INTRAPERITONEAL SPACE:  Unremarkable.  No free air.  No significant  fluid collection.  BONES/JOINTS:  Degenerative disc  disease throughout the lumbar spine.  Degenerative facet arthropathy throughout the lumbar spine, most  prominent in the lower lumbar spine.  No acute fracture.  No  dislocation.  SOFT TISSUES:  Unremarkable.  VASCULATURE:  Unremarkable.  No abdominal aortic aneurysm.  LYMPH NODES:  Unremarkable.  No enlarged lymph nodes.    Impression  1.  Mild pelvocaliectasis of the left kidney that may represent  parapelvic cystic formation or sequelae of chronic UPJ stenosis.  2.  Heterogeneous mild enlargement of the prostate gland measuring up to  4.6 cm.  3.  Degenerative changes lumbar spine as described.    This report was finalized on 2/18/2022 3:02 PM by Dr. Garry Loomis MD.       CT Stone Protocol: No results found for this or any previous visit.       KUB: No results found for this or any previous visit.       Labs (past 3 months):      No visits with results within 3 Month(s) from this visit.   Latest known visit with results is:   Office Visit on 12/05/2023   Component Date Value Ref Range Status    PSA 12/05/2023 0.213  0.000 - 4.000 ng/mL Final        Procedure:       Assessment/Plan:   Prostate cancer:  He returns today status post biopsy for extensive discussion of prostate cancer.  We discussed staging and grading of the disease.  I described with a Ruthann system being from a 2 to10 scale with the most common low-grade pattern being a Rienzi 6.  I discussed the staging workup including a total body bone scan and CT scan especially with a PSA greater than 10.  We discussed the various options at length. I discussed a radical retropubic prostatectomy done in the traditional fashion and using the robotic technique.  I then discussed radiation treatment both seed therapy and external beam therapy using Gold fiduciary markers.  We talked about some of the other alternatives such as a cryosurgical ablation at high intensity focused ultrasound as being viable alternatives but not recommended at this time.  He  focused on aggressive watchful waiting and explained that currently low literature it's been discovered that a lot of men can actually observe it especially with numerous other comorbidities cannot have problems from the cancer by itself.  Talked about hormonal ablation and the side effects of creation of insulin resistance.  Overall, the patient was given appropriate literature, is going to think about it, and I'm going to revisit the topic with them after the next office visit.  Status post radiation doing well            This document has been electronically signed by JOSE BARTH MD June 6, 2024 08:43 EDT    Dictated Utilizing Dragon Dictation: Part of this note may be an electronic transcription/translation of spoken language to printed text using the Dragon Dictation System.

## 2024-11-05 ENCOUNTER — TELEPHONE (OUTPATIENT)
Dept: UROLOGY | Facility: CLINIC | Age: 74
End: 2024-11-05
Payer: MEDICARE

## 2024-11-05 DIAGNOSIS — N40.0 BENIGN PROSTATIC HYPERPLASIA, UNSPECIFIED WHETHER LOWER URINARY TRACT SYMPTOMS PRESENT: ICD-10-CM

## 2024-11-05 NOTE — TELEPHONE ENCOUNTER
The patient would like a refill on his 5 mg Cialis called in [facility]

## 2024-11-07 RX ORDER — TADALAFIL 5 MG/1
5 TABLET ORAL DAILY PRN
Qty: 30 TABLET | Refills: 6 | Status: SHIPPED | OUTPATIENT
Start: 2024-11-07

## 2024-11-07 NOTE — TELEPHONE ENCOUNTER
Caller: Gabino Chatterjee     Relationship: [employer]    Best call back number: [phone]     Requested Prescriptions: tadalafil (Cialis) 5 MG tablet   Requested Prescriptions      No prescriptions requested or ordered in this encounter        Pharmacy where request should be sent:    [facility] [id number] [facility] [address] - [phone] PH - [phone] FX       Last office visit with prescribing clinician: 6/6/2024   Last telemedicine visit with prescribing clinician: Visit date not found   Next office visit with prescribing clinician: 12/10/2024     Additional details provided by patient: PT IS COMPLETELY OUT OF MEDS    Does the patient have less than a 3 day supply:  [x] Yes  [] No    Would you like a call back once the refill request has been completed: [] Yes [x] No    If the office needs to give you a call back, can they leave a voicemail: [] Yes [x] No    Cheryl Landaverde Rep   11/07/24 10:25 EST

## 2024-12-10 ENCOUNTER — OFFICE VISIT (OUTPATIENT)
Dept: UROLOGY | Facility: CLINIC | Age: 74
End: 2024-12-10
Payer: MEDICARE

## 2024-12-10 VITALS — WEIGHT: 176 LBS | BODY MASS INDEX: 26.07 KG/M2 | HEIGHT: 69 IN

## 2024-12-10 DIAGNOSIS — C61 PROSTATE CANCER: Primary | ICD-10-CM

## 2024-12-10 LAB — PSA SERPL-MCNC: 0.15 NG/ML (ref 0–4)

## 2024-12-10 PROCEDURE — 36415 COLL VENOUS BLD VENIPUNCTURE: CPT | Performed by: UROLOGY

## 2024-12-10 PROCEDURE — 84153 ASSAY OF PSA TOTAL: CPT | Performed by: UROLOGY

## 2024-12-10 PROCEDURE — 1160F RVW MEDS BY RX/DR IN RCRD: CPT | Performed by: UROLOGY

## 2024-12-10 PROCEDURE — 1159F MED LIST DOCD IN RCRD: CPT | Performed by: UROLOGY

## 2024-12-10 PROCEDURE — 99213 OFFICE O/P EST LOW 20 MIN: CPT | Performed by: UROLOGY

## 2024-12-10 NOTE — PROGRESS NOTES
Chief Complaint:      Chief Complaint   Patient presents with    Prostate Cancer       HPI:   74 y.o. male prostate cancer doing great.  He had a loose tooth and lumbar sciatica, but from a cancer standpoint he is doing great he reports no lower urinary tract symptomatology, particularly irritative symptoms such as frequency, urgency, dysuria, and obstructive symptomatology, particularly dribbling, hesitancy, and intermittency.    Past Medical History:     Past Medical History:   Diagnosis Date    Benign prostatic hyperplasia without lower urinary tract symptoms     Elevated PSA     Erectile dysfunction     Hypertension     Kidney stone     Prostate cancer 2/7/2022       Current Meds:     Current Outpatient Medications   Medication Sig Dispense Refill    allopurinol (ZYLOPRIM) 300 MG tablet Take 1 tablet by mouth Daily.      cetirizine (zyrTEC) 10 MG tablet Take 1 tablet by mouth Daily.      finasteride (PROSCAR) 5 MG tablet Take 1 tablet by mouth Daily. 90 tablet 3    lisinopril (PRINIVIL,ZESTRIL) 20 MG tablet Take 1 tablet by mouth Daily.      montelukast (SINGULAIR) 10 MG tablet Take 1 tablet by mouth Every Night.      pantoprazole (PROTONIX) 40 MG EC tablet Take 1 tablet by mouth 2 (Two) Times a Day.      tadalafil (Cialis) 20 MG tablet Take 1 tablet by mouth As Needed for Erectile Dysfunction. 20 tablet 1    tadalafil (Cialis) 5 MG tablet Take 1 tablet by mouth Daily As Needed for Erectile Dysfunction. Take one Daily 30 tablet 6     No current facility-administered medications for this visit.        Allergies:      No Known Allergies     Past Surgical History:     Past Surgical History:   Procedure Laterality Date    KIDNEY STONE SURGERY      PARTIAL KNEE ARTHROPLASTY Right        Social History:     Social History     Socioeconomic History    Marital status:    Tobacco Use    Smoking status: Never    Smokeless tobacco: Never   Vaping Use    Vaping status: Never Used   Substance and Sexual Activity     Alcohol use: Yes     Alcohol/week: 0.0 standard drinks of alcohol     Comment: Occasionally beer    Drug use: No    Sexual activity: Yes     Partners: Female     Birth control/protection: None       Family History:     Family History   Problem Relation Age of Onset    No Known Problems Father     Heart disease Mother        Review of Systems:     Review of Systems   Constitutional: Negative.    HENT: Negative.     Eyes: Negative.    Respiratory: Negative.     Cardiovascular: Negative.    Gastrointestinal: Negative.    Endocrine: Negative.    Musculoskeletal: Negative.    Allergic/Immunologic: Negative.    Neurological: Negative.    Hematological: Negative.    Psychiatric/Behavioral: Negative.         Physical Exam:     Physical Exam  Vitals and nursing note reviewed.   Constitutional:       Appearance: He is well-developed.   HENT:      Head: Normocephalic and atraumatic.   Eyes:      Conjunctiva/sclera: Conjunctivae normal.      Pupils: Pupils are equal, round, and reactive to light.   Cardiovascular:      Rate and Rhythm: Normal rate and regular rhythm.      Heart sounds: Normal heart sounds.   Pulmonary:      Effort: Pulmonary effort is normal.      Breath sounds: Normal breath sounds.   Abdominal:      General: Bowel sounds are normal.      Palpations: Abdomen is soft.   Musculoskeletal:         General: Normal range of motion.      Cervical back: Normal range of motion.   Skin:     General: Skin is warm and dry.   Neurological:      Mental Status: He is alert and oriented to person, place, and time.      Deep Tendon Reflexes: Reflexes are normal and symmetric.   Psychiatric:         Behavior: Behavior normal.         Thought Content: Thought content normal.         Judgment: Judgment normal.         I have reviewed the following portions of the patient's history: Allergies, current medications, past family history, past medical history, past social history, past surgical history, problem list, and ROS and  confirm it is accurate.    Recent Image (CT and/or KUB):      CT Abdomen and Pelvis: Results for orders placed during the hospital encounter of 02/18/22    CT Abdomen Pelvis With & Without Contrast    Narrative  EXAM:  CT Abdomen and Pelvis Without and With Intravenous Contrast    EXAM DATE:  2/18/2022 7:53 AM    CLINICAL HISTORY:  Prostate cancer, initial staging, low risk; [id number] neoplasm of  prostate    TECHNIQUE:  Axial computed tomography images of the abdomen and pelvis without and  with intravenous contrast.  Sagittal and coronal reformatted images were  created and reviewed.  This CT exam was performed using one or more of  the following dose reduction techniques:  automated exposure control,  adjustment of the mA and/or kV according to patient size, and/or use of  iterative reconstruction technique.    COMPARISON:  No relevant prior studies available.    FINDINGS:  LUNG BASES:  Unremarkable.  No mass.  No consolidation.    ABDOMEN:  LIVER:  Unremarkable.  No mass.  GALLBLADDER AND BILE DUCTS:  Unremarkable.  No calcified stones.  No  ductal dilation.  PANCREAS:  Unremarkable.  No mass.  No ductal dilation.  SPLEEN:  Unremarkable.  No splenomegaly.  ADRENALS:  Unremarkable.  No mass.  KIDNEYS AND URETERS:  Mild pelvocaliectasis of the left kidney that  may represent parapelvic cystic formation or sequelae of chronic UPJ  stenosis.  No obstructing stones.  No hydronephrosis.  STOMACH AND BOWEL:  Unremarkable.  No obstruction.  No mucosal  thickening.    PELVIS:  APPENDIX:  No findings to suggest acute appendicitis.  BLADDER:  Unremarkable.  No mass.  No stones.  REPRODUCTIVE:  Heterogeneous mild enlargement of the prostate gland  measuring up to 4.6 cm.    ABDOMEN and PELVIS:  INTRAPERITONEAL SPACE:  Unremarkable.  No free air.  No significant  fluid collection.  BONES/JOINTS:  Degenerative disc disease throughout the lumbar spine.  Degenerative facet arthropathy throughout the lumbar spine,  most  prominent in the lower lumbar spine.  No acute fracture.  No  dislocation.  SOFT TISSUES:  Unremarkable.  VASCULATURE:  Unremarkable.  No abdominal aortic aneurysm.  LYMPH NODES:  Unremarkable.  No enlarged lymph nodes.    Impression  1.  Mild pelvocaliectasis of the left kidney that may represent  parapelvic cystic formation or sequelae of chronic UPJ stenosis.  2.  Heterogeneous mild enlargement of the prostate gland measuring up to  4.6 cm.  3.  Degenerative changes lumbar spine as described.    This report was finalized on 2/18/2022 3:02 PM by Dr. Garry Loomis MD.       CT Stone Protocol: No results found for this or any previous visit.       KUB: No results found for this or any previous visit.       Labs (past 3 months):      No visits with results within 3 Month(s) from this visit.   Latest known visit with results is:   Office Visit on 06/06/2024   Component Date Value Ref Range Status    PSA 06/06/2024 0.167  0.000 - 4.000 ng/mL Final        Procedure:       Assessment/Plan:   Prostate cancer:  He returns today status post biopsy for extensive discussion of prostate cancer.  We discussed staging and grading of the disease.  I described with a Ruthann system being from a 2 to10 scale with the most common low-grade pattern being a Ruthann 6.  I discussed the staging workup including a total body bone scan and CT scan especially with a PSA greater than 10.  We discussed the various options at length. I discussed a radical retropubic prostatectomy done in the traditional fashion and using the robotic technique.  I then discussed radiation treatment both seed therapy and external beam therapy using Gold fiduciary markers.  We talked about some of the other alternatives such as a cryosurgical ablation at high intensity focused ultrasound as being viable alternatives but not recommended at this time.  He focused on aggressive watchful waiting and explained that currently low literature it's been discovered  that a lot of men can actually observe it especially with numerous other comorbidities cannot have problems from the cancer by itself.  Talked about hormonal ablation and the side effects of creation of insulin resistance.  Overall, the patient was given appropriate literature, is going to think about it, and I'm going to revisit the topic with them after the next office visit.            This document has been electronically signed by JOSE BARTH MD December 10, 2024 08:56 EST    Dictated Utilizing Dragon Dictation: Part of this note may be an electronic transcription/translation of spoken language to printed text using the Dragon Dictation System.

## 2024-12-11 ENCOUNTER — TELEPHONE (OUTPATIENT)
Dept: UROLOGY | Facility: CLINIC | Age: 74
End: 2024-12-11
Payer: MEDICARE

## 2024-12-11 NOTE — TELEPHONE ENCOUNTER
I called the pt and let him know his psa levels.    ----- Message from Jayson Mccormick sent at 12/11/2024  7:27 AM EST -----  Notify great  ----- Message -----  From: Lab, Background User  Sent: 12/10/2024   8:25 PM EST  To: Jayson Mccormick MD

## 2025-04-10 ENCOUNTER — OFFICE VISIT (OUTPATIENT)
Dept: RADIATION ONCOLOGY | Facility: HOSPITAL | Age: 75
End: 2025-04-10
Payer: MEDICARE

## 2025-04-10 VITALS
DIASTOLIC BLOOD PRESSURE: 76 MMHG | HEART RATE: 61 BPM | WEIGHT: 184.6 LBS | OXYGEN SATURATION: 96 % | SYSTOLIC BLOOD PRESSURE: 150 MMHG | TEMPERATURE: 97.8 F | BODY MASS INDEX: 27.25 KG/M2 | RESPIRATION RATE: 16 BRPM

## 2025-04-10 DIAGNOSIS — C61 PROSTATE CANCER: Primary | ICD-10-CM

## 2025-04-10 PROCEDURE — G0463 HOSPITAL OUTPT CLINIC VISIT: HCPCS | Performed by: RADIOLOGY

## 2025-04-10 NOTE — PROGRESS NOTES
Established Patient Visit      Patient: Cristopher Chatterjee   YOB: 1950   Medical Record Number: [id number]   Date of Visit: April 10, 2025   Primary Diagnosis:  Cancer Staging   stage IIb (cT2 cN0c M0) grade group 2, Ruthann's 7 (3+4) carcinoma the prostate.      ICD 10 Code: C61                                            History of Present Illness: Mr. Hwang returns today as an established patient for an office visit.  He completed a primary course of radiation therapy on 5/11/2022.    It is now been approximately 2 years since he finished his radiation therapy.  He received treatment between 4/4/2022 and 5/11/2022.  The prostate received 7000 cGy in 250 cGy fractions for 28 treatments.  He tolerated therapy very well.    He had a PSA drawn on 6/6/2024 and it was down to 0.167.  Another PSA followed on 12/10/2024 and is down even further to 0.146.    Today Mr. Hwang is doing quite well.  His appetite is healthy weight stable performance level high.  He is having no dysuria hematuria or nocturia.  He still has erectile dysfunction which is helped somewhat with Cialis.  He has no GI problems.  He is scheduled for his every 5 year colonoscopy later this spring.    Review of Systems       All other systems reviewed and are negative.    Past Medical History:   Diagnosis Date    Benign prostatic hyperplasia without lower urinary tract symptoms     Elevated PSA     Erectile dysfunction     Hypertension     Kidney stone     Prostate cancer 2/7/2022        Past Surgical History:   Procedure Laterality Date    KIDNEY STONE SURGERY      PARTIAL KNEE ARTHROPLASTY Right       Family History   Problem Relation Age of Onset    No Known Problems Father     Heart disease Mother         Social History     Socioeconomic History    Marital status:    Tobacco Use    Smoking status: Never    Smokeless tobacco: Never   Vaping Use    Vaping status: Never Used   Substance and Sexual Activity    Alcohol use: Yes      Alcohol/week: 0.0 standard drinks of alcohol     Comment: Occasionally beer    Drug use: No    Sexual activity: Yes     Partners: Female     Birth control/protection: None         Allergies:  Patient has no known allergies.   Prior to Admission medications    Medication Sig Start Date End Date Taking? Authorizing Provider   allopurinol (ZYLOPRIM) 300 MG tablet Take 1 tablet by mouth Daily. 10/23/17  Yes Nato Fritz MD   cetirizine (zyrTEC) 10 MG tablet Take 1 tablet by mouth Daily.   Yes Nato Fritz MD   finasteride (PROSCAR) 5 MG tablet Take 1 tablet by mouth Daily. 4/24/22  Yes Jayson Mccormick MD   lisinopril (PRINIVIL,ZESTRIL) 20 MG tablet Take 1 tablet by mouth 2 (Two) Times a Day. 6/20/23  Yes Nato Fritz MD   montelukast (SINGULAIR) 10 MG tablet Take 1 tablet by mouth Every Night.   Yes Nato Fritz MD   pantoprazole (PROTONIX) 40 MG EC tablet Take 1 tablet by mouth 2 (Two) Times a Day. 12/12/21  Yes Nato Fritz MD   tadalafil (Cialis) 20 MG tablet Take 1 tablet by mouth As Needed for Erectile Dysfunction. 5/30/23  Yes Jayson Mccormick MD   tadalafil (Cialis) 5 MG tablet Take 1 tablet by mouth Daily As Needed for Erectile Dysfunction. Take one Daily 11/7/24  Yes Jayson Mccormick MD      Pain:(on a scale of 0-10)    Pain Score    04/10/25 1259   PainSc: 0-No pain        Cristopher Chatterjee reports a pain score of 0.  Given his pain assessment as noted, treatment options were discussed and the following options were decided upon as a follow-up plan to address the patient's pain: continuation of current treatment plan for pain.       Quality of Life:   ECOG: (0) Fully active, able to carry on all predisease performance without restriction        Physical Examination:  Vitals:  VITALS@    Height:    Weight: Weight: 83.7 kg (184 lb 9.6 oz) Body mass index is 27.25 kg/m².    Physical Exam  Constitutional: The patient is a well-developed, well-nourished  "74-year-old male in no acute distress.  Alert and oriented ×3.  He appears younger than his stated age  HEENT: Atraumatic. Normocephalic. No abnormalities noted.  No icterus or jaundice  Lymphatics: No cervical, supraclavicular or inguinal lymphadenopathy is palpated.  CV: Regular rate and rhythm.  No murmurs, rubs, or gallops are appreciated.  Respiratory: Lungs clear to auscultation.  Breath sounds equal bilaterally.  Breasts: No gynecomastia  GI: Abdomen soft, nontender, nondistended, with no hepatosplenomegaly or masses palpated.  Rectal: Deferred  Extremities: No clubbing, cyanosis, or edema.  Neurologic: Cranial nerves II through XII are grossly intact, with no focal neurological deficits noted on exam.  Psychiatric: Alert and oriented x3. Normal affect, with no anxiety or depression noted.    Radiographs : No new  Pathology: No new  Labs:   Lab Results   Component Value Date    CREATININE 1.00 02/18/2022    No results found for: \"WBC\", \"HGB\", \"HCT\", \"PLT\"   PSA   Date Value Ref Range Status   12/10/2024 0.146 0.000 - 4.000 ng/mL Final   06/06/2024 0.167 0.000 - 4.000 ng/mL Final   12/05/2023 0.213 0.000 - 4.000 ng/mL Final   06/21/2023 0.238 0.000 - 4.000 ng/mL Final   12/21/2022 0.448 0.000 - 4.000 ng/mL Final   08/17/2022 0.863 0.000 - 4.000 ng/mL Final   05/24/2022 2.480 0.000 - 4.000 ng/mL Final   12/09/2021 6.3 (H) 0.0 - 4.0 ng/mL Final     Comment:     Roche ECLIA methodology.  According to the [employer] Association, Serum PSA should  decrease and remain at undetectable levels after radical  prostatectomy. The AUA defines biochemical recurrence as an initial  PSA value 0.2 ng/mL or greater followed by a subsequent confirmatory  PSA value 0.2 ng/mL or greater.  Values obtained with different assay methods or kits cannot be used  interchangeably. Results cannot be interpreted as absolute evidence  of the presence or absence of malignant disease.   06/03/2021 4.7 (H) 0.0 - 4.0 ng/mL Final     " "Comment:     Roche ECLIA methodology.  According to the [employer] Association, Serum PSA should  decrease and remain at undetectable levels after radical  prostatectomy. The AUA defines biochemical recurrence as an initial  PSA value 0.2 ng/mL or greater followed by a subsequent confirmatory  PSA value 0.2 ng/mL or greater.  Values obtained with different assay methods or kits cannot be used  interchangeably. Results cannot be interpreted as absolute evidence  of the presence or absence of malignant disease.   12/10/2020 4.410 (H) 0.000 - 4.000 ng/mL Final   12/20/2018 2.980 0.000 - 4.000 ng/mL Final   11/30/2017 2.370 0.000 - 4.000 ng/mL Final    No results found for: \"CEA\"           ASSESSMENT/PLAN: C61, stage IIb (cT2, cN0, M0) grade group 2 Ruthann 7 carcinoma the prostate status post radiation therapy.  His pretreatment PSA was 6.3.  He is current PSA is low down to 0.146.    There is no evidence of local regional or systemic disease at this time.  I have asked him to continue to have annual PSAs performed.  He should continue follow-up with his urologist and return to see us in 1 year.    Sincerely,      Electronically signed by Mitch Huitron MD 4/10/2025  13:10 EDT    "

## 2025-04-14 ENCOUNTER — PATIENT ROUNDING (BHMG ONLY) (OUTPATIENT)
Dept: RADIATION ONCOLOGY | Facility: HOSPITAL | Age: 75
End: 2025-04-14
Payer: MEDICARE

## 2025-07-16 ENCOUNTER — OFFICE VISIT (OUTPATIENT)
Dept: UROLOGY | Facility: CLINIC | Age: 75
End: 2025-07-16
Payer: MEDICARE

## 2025-07-16 VITALS
SYSTOLIC BLOOD PRESSURE: 159 MMHG | DIASTOLIC BLOOD PRESSURE: 87 MMHG | BODY MASS INDEX: 26.13 KG/M2 | HEIGHT: 69 IN | WEIGHT: 176.4 LBS | HEART RATE: 55 BPM

## 2025-07-16 DIAGNOSIS — C61 PROSTATE CANCER: Primary | ICD-10-CM

## 2025-07-16 PROCEDURE — 84153 ASSAY OF PSA TOTAL: CPT

## 2025-07-16 NOTE — PROGRESS NOTES
"Chief Complaint:    Chief Complaint   Patient presents with    Prostate Cancer     Follow up        Vital Signs:   /87 (BP Location: Left arm, Patient Position: Sitting, Cuff Size: Adult)   Pulse 55   Ht 175.3 cm (69.02\")   Wt 80 kg (176 lb 6.4 oz)   BMI 26.04 kg/m²   Body mass index is 26.04 kg/m².      HPI:  Cristopher Chatterjee is a 75 y.o. male who presents today for follow up    History of Present Illness  Mr. Chatterjee returns to the clinic today for follow-up for prostate cancer.  He is status post initial diagnosis in February 2024.  He was referred to radiation oncology and completed treatments in April and May 2024.  He is followed along with Dr. Mccormick routinely and has had a gradual decrease in his overall PSA.  PSAs on file are continuing to decrease and last PSA was 0.14 in December 2024.  He denies any current lower urinary tract symptoms.  He is doing well with current medications.  He has no other complaints  Prostate Cancer  Symptoms: no abdominal pain, no chest pain, no fatigue, no fever, no nausea, no rash, no dysuria and no vomiting        Past Medical History:  Past Medical History:   Diagnosis Date    Benign prostatic hyperplasia without lower urinary tract symptoms     Elevated PSA     Erectile dysfunction     Hypertension     Kidney stone     Prostate cancer 2/7/2022       Current Meds:  Current Outpatient Medications   Medication Sig Dispense Refill    allopurinol (ZYLOPRIM) 300 MG tablet Take 1 tablet by mouth Daily.      cetirizine (zyrTEC) 10 MG tablet Take 1 tablet by mouth Daily.      finasteride (PROSCAR) 5 MG tablet Take 1 tablet by mouth Daily. 90 tablet 3    lisinopril (PRINIVIL,ZESTRIL) 20 MG tablet Take 1 tablet by mouth 2 (Two) Times a Day.      montelukast (SINGULAIR) 10 MG tablet Take 1 tablet by mouth Every Night.      pantoprazole (PROTONIX) 40 MG EC tablet Take 1 tablet by mouth 2 (Two) Times a Day.      tadalafil (Cialis) 20 MG tablet Take 1 tablet by mouth As Needed for " Erectile Dysfunction. (Patient not taking: Reported on 7/16/2025) 20 tablet 1    tadalafil (Cialis) 5 MG tablet Take 1 tablet by mouth Daily As Needed for Erectile Dysfunction. Take one Daily (Patient not taking: Reported on 7/16/2025) 30 tablet 6     No current facility-administered medications for this visit.        Allergies:   No Known Allergies     Past Surgical History:  Past Surgical History:   Procedure Laterality Date    KIDNEY STONE SURGERY      PARTIAL KNEE ARTHROPLASTY Right        Social History:  Social History     Socioeconomic History    Marital status:    Tobacco Use    Smoking status: Never    Smokeless tobacco: Never   Vaping Use    Vaping status: Never Used   Substance and Sexual Activity    Alcohol use: Yes     Alcohol/week: 0.0 standard drinks of alcohol     Comment: Occasionally beer    Drug use: No    Sexual activity: Yes     Partners: Female     Birth control/protection: None       Family History:  Family History   Problem Relation Age of Onset    No Known Problems Father     Heart disease Mother        Review of Systems:  Review of Systems   Constitutional:  Negative for fatigue, fever and unexpected weight change.   Respiratory:  Negative for chest tightness and shortness of breath.    Cardiovascular:  Negative for chest pain.   Gastrointestinal:  Negative for abdominal pain, constipation, diarrhea, nausea and vomiting.   Genitourinary:  Negative for difficulty urinating, dysuria, frequency and urgency.   Skin:  Negative for rash.   Psychiatric/Behavioral:  Negative for confusion and suicidal ideas.        Physical Exam:  Physical Exam  Constitutional:       General: He is not in acute distress.     Appearance: Normal appearance.   HENT:      Head: Normocephalic and atraumatic.      Nose: Nose normal.      Mouth/Throat:      Mouth: Mucous membranes are moist.   Eyes:      Conjunctiva/sclera: Conjunctivae normal.   Cardiovascular:      Rate and Rhythm: Normal rate.      Pulses:  Normal pulses.   Pulmonary:      Effort: Pulmonary effort is normal.   Abdominal:      Palpations: Abdomen is soft.   Musculoskeletal:         General: Normal range of motion.      Cervical back: Normal range of motion.   Skin:     General: Skin is warm.   Neurological:      General: No focal deficit present.      Mental Status: He is alert and oriented to person, place, and time.   Psychiatric:         Mood and Affect: Mood normal.         Behavior: Behavior normal.         Thought Content: Thought content normal.         Judgment: Judgment normal.         Recent Image (CT and/or KUB):   CT Abdomen and Pelvis: Results for orders placed during the hospital encounter of 02/18/22    CT Abdomen Pelvis With & Without Contrast    Narrative  EXAM:  CT Abdomen and Pelvis Without and With Intravenous Contrast    EXAM DATE:  2/18/2022 7:53 AM    CLINICAL HISTORY:  Prostate cancer, initial staging, low risk; D98-[id number] neoplasm of  prostate    TECHNIQUE:  Axial computed tomography images of the abdomen and pelvis without and  with intravenous contrast.  Sagittal and coronal reformatted images were  created and reviewed.  This CT exam was performed using one or more of  the following dose reduction techniques:  automated exposure control,  adjustment of the mA and/or kV according to patient size, and/or use of  iterative reconstruction technique.    COMPARISON:  No relevant prior studies available.    FINDINGS:  LUNG BASES:  Unremarkable.  No mass.  No consolidation.    ABDOMEN:  LIVER:  Unremarkable.  No mass.  GALLBLADDER AND BILE DUCTS:  Unremarkable.  No calcified stones.  No  ductal dilation.  PANCREAS:  Unremarkable.  No mass.  No ductal dilation.  SPLEEN:  Unremarkable.  No splenomegaly.  ADRENALS:  Unremarkable.  No mass.  KIDNEYS AND URETERS:  Mild pelvocaliectasis of the left kidney that  may represent parapelvic cystic formation or sequelae of chronic UPJ  stenosis.  No obstructing stones.  No  hydronephrosis.  STOMACH AND BOWEL:  Unremarkable.  No obstruction.  No mucosal  thickening.    PELVIS:  APPENDIX:  No findings to suggest acute appendicitis.  BLADDER:  Unremarkable.  No mass.  No stones.  REPRODUCTIVE:  Heterogeneous mild enlargement of the prostate gland  measuring up to 4.6 cm.    ABDOMEN and PELVIS:  INTRAPERITONEAL SPACE:  Unremarkable.  No free air.  No significant  fluid collection.  BONES/JOINTS:  Degenerative disc disease throughout the lumbar spine.  Degenerative facet arthropathy throughout the lumbar spine, most  prominent in the lower lumbar spine.  No acute fracture.  No  dislocation.  SOFT TISSUES:  Unremarkable.  VASCULATURE:  Unremarkable.  No abdominal aortic aneurysm.  LYMPH NODES:  Unremarkable.  No enlarged lymph nodes.    Impression  1.  Mild pelvocaliectasis of the left kidney that may represent  parapelvic cystic formation or sequelae of chronic UPJ stenosis.  2.  Heterogeneous mild enlargement of the prostate gland measuring up to  4.6 cm.  3.  Degenerative changes lumbar spine as described.    This report was finalized on 2/18/2022 3:02 PM by Dr. Garry Loomis MD.     CT Stone Protocol: No results found for this or any previous visit.     KUB: No results found for this or any previous visit.       Labs:  Brief Urine Lab Results       None          No visits with results within 3 Month(s) from this visit.   Latest known visit with results is:   Office Visit on 12/10/2024   Component Date Value Ref Range Status    PSA 12/10/2024 0.146  0.000 - 4.000 ng/mL Final        Procedure: None  Procedures     I have reviewed and agree with the above PMH, PSH, FMH, social history, medications, allergies, and labs.     Assessment/Plan:   Problem List Items Addressed This Visit       Prostate cancer - Primary    Relevant Orders    PSA Diagnostic       Health Maintenance:   Health Maintenance Due   Topic Date Due    TDAP/TD VACCINES (1 - Tdap) Never done    COLORECTAL CANCER SCREENING   Never done    Pneumococcal Vaccine 50+ (1 of 1 - PCV) Never done    ZOSTER VACCINE (1 of 2) Never done    HEPATITIS C SCREENING  Never done    ANNUAL WELLNESS VISIT  Never done    RSV Vaccine - Adults (1 - 1-dose 75+ series) Never done    COVID-19 Vaccine (6 - 2024-25 season) 04/05/2025        Smoking Counseling: Never smoked.  Never used smokeless tobacco.  Counseling given    Urine Incontinence: Patient reports that he is not currently experiencing any symptoms of urinary incontinence.    Patient was given instructions and counseling regarding his condition or for health maintenance advice. Please see specific information pulled into the AVS if appropriate.    Patient Education:   Prostate cancer -s/p radiation with down trending PSA.  Patient is doing well with minimal lower urinary tract symptoms we will proceed forward with repeat PSA in office today.  He did have a high Oncotype concerning for possible recurrence and would recommend observation with a repeat PSA in 6 months.  Did advise patient if PSA remains stable we will proceed for with observation yearly.  Did discuss the use of androgen deprivation therapy as well as repeat workup with imaging for concerns of metastatic disease if PSAs increase.  Did discuss of other forms of treatment such as salvage radiation in areas of metastatic disease versus salvage radical prostatectomy.  Discussed the risk of both of these in detail.  Otherwise we will place him back in 6 months or sooner if needed    Visit Diagnoses:    ICD-10-CM ICD-9-CM   1. Prostate cancer  C61 185     A total of 25 minutes were spent coordinating this patient’s care in clinic today; 15 minutes of which were face-to-face with the patient, reviewing medical history and counseling on the current treatment and followup plan.  All questions were answered to patient's satisfaction.    Meds Ordered During Visit:  No orders of the defined types were placed in this encounter.      Follow Up  Appointment: 6 months  No follow-ups on file.      This document has been electronically signed by Stan Encarnacion PA-C   July 16, 2025 11:23 EDT    Part of this note may be an electronic transcription/translation of spoken language to printed text using the Dragon Dictation System.

## 2025-07-17 ENCOUNTER — RESULTS FOLLOW-UP (OUTPATIENT)
Dept: UROLOGY | Facility: CLINIC | Age: 75
End: 2025-07-17
Payer: MEDICARE

## 2025-07-17 LAB — PSA SERPL-MCNC: 0.08 NG/ML (ref 0–4)

## 2025-07-17 NOTE — TELEPHONE ENCOUNTER
Called and advised that PSA came back lower and would recommend his to keep follow-up.  They verbalized understanding